# Patient Record
Sex: FEMALE | Race: WHITE | NOT HISPANIC OR LATINO | Employment: OTHER | URBAN - METROPOLITAN AREA
[De-identification: names, ages, dates, MRNs, and addresses within clinical notes are randomized per-mention and may not be internally consistent; named-entity substitution may affect disease eponyms.]

---

## 2024-02-27 ENCOUNTER — HOSPITAL ENCOUNTER (EMERGENCY)
Facility: HOSPITAL | Age: 49
Discharge: HOME | End: 2024-02-27
Attending: EMERGENCY MEDICINE | Admitting: EMERGENCY MEDICINE
Payer: MEDICARE

## 2024-02-27 ENCOUNTER — APPOINTMENT (OUTPATIENT)
Dept: CARDIOLOGY | Facility: HOSPITAL | Age: 49
End: 2024-02-27
Payer: MEDICARE

## 2024-02-27 VITALS
OXYGEN SATURATION: 98 % | DIASTOLIC BLOOD PRESSURE: 90 MMHG | RESPIRATION RATE: 16 BRPM | SYSTOLIC BLOOD PRESSURE: 122 MMHG | HEART RATE: 95 BPM | TEMPERATURE: 97.6 F

## 2024-02-27 DIAGNOSIS — E87.6 HYPOKALEMIA: ICD-10-CM

## 2024-02-27 DIAGNOSIS — M79.605 BILATERAL LEG PAIN: Primary | ICD-10-CM

## 2024-02-27 DIAGNOSIS — M79.604 BILATERAL LEG PAIN: Primary | ICD-10-CM

## 2024-02-27 LAB
ALBUMIN SERPL-MCNC: 4.4 G/DL (ref 3.5–5.2)
ALBUMIN/GLOB SERPL: 1.7 G/DL
ALP SERPL-CCNC: 72 U/L (ref 39–117)
ALT SERPL W P-5'-P-CCNC: 15 U/L (ref 1–33)
ANION GAP SERPL CALCULATED.3IONS-SCNC: 10 MMOL/L (ref 5–15)
AST SERPL-CCNC: 18 U/L (ref 1–32)
BASOPHILS # BLD AUTO: 0.09 10*3/MM3 (ref 0–0.2)
BASOPHILS NFR BLD AUTO: 1.2 % (ref 0–1.5)
BH CV LOWER VASCULAR LEFT COMMON FEMORAL AUGMENT: NORMAL
BH CV LOWER VASCULAR LEFT COMMON FEMORAL COMPETENT: NORMAL
BH CV LOWER VASCULAR LEFT COMMON FEMORAL COMPRESS: NORMAL
BH CV LOWER VASCULAR LEFT COMMON FEMORAL PHASIC: NORMAL
BH CV LOWER VASCULAR LEFT COMMON FEMORAL SPONT: NORMAL
BH CV LOWER VASCULAR LEFT DISTAL FEMORAL COMPRESS: NORMAL
BH CV LOWER VASCULAR LEFT GASTRONEMIUS COMPRESS: NORMAL
BH CV LOWER VASCULAR LEFT GREATER SAPH AK COMPRESS: NORMAL
BH CV LOWER VASCULAR LEFT GREATER SAPH BK COMPRESS: NORMAL
BH CV LOWER VASCULAR LEFT LESSER SAPH COMPRESS: NORMAL
BH CV LOWER VASCULAR LEFT MID FEMORAL AUGMENT: NORMAL
BH CV LOWER VASCULAR LEFT MID FEMORAL COMPETENT: NORMAL
BH CV LOWER VASCULAR LEFT MID FEMORAL COMPRESS: NORMAL
BH CV LOWER VASCULAR LEFT MID FEMORAL PHASIC: NORMAL
BH CV LOWER VASCULAR LEFT MID FEMORAL SPONT: NORMAL
BH CV LOWER VASCULAR LEFT PERONEAL COMPRESS: NORMAL
BH CV LOWER VASCULAR LEFT POPLITEAL AUGMENT: NORMAL
BH CV LOWER VASCULAR LEFT POPLITEAL COMPETENT: NORMAL
BH CV LOWER VASCULAR LEFT POPLITEAL COMPRESS: NORMAL
BH CV LOWER VASCULAR LEFT POPLITEAL PHASIC: NORMAL
BH CV LOWER VASCULAR LEFT POPLITEAL SPONT: NORMAL
BH CV LOWER VASCULAR LEFT POSTERIOR TIBIAL COMPRESS: NORMAL
BH CV LOWER VASCULAR LEFT PROFUNDA FEMORAL COMPRESS: NORMAL
BH CV LOWER VASCULAR LEFT PROXIMAL FEMORAL COMPRESS: NORMAL
BH CV LOWER VASCULAR LEFT SAPHENOFEMORAL JUNCTION COMPRESS: NORMAL
BH CV LOWER VASCULAR RIGHT COMMON FEMORAL AUGMENT: NORMAL
BH CV LOWER VASCULAR RIGHT COMMON FEMORAL COMPETENT: NORMAL
BH CV LOWER VASCULAR RIGHT COMMON FEMORAL COMPRESS: NORMAL
BH CV LOWER VASCULAR RIGHT COMMON FEMORAL PHASIC: NORMAL
BH CV LOWER VASCULAR RIGHT COMMON FEMORAL SPONT: NORMAL
BH CV LOWER VASCULAR RIGHT DISTAL FEMORAL COMPRESS: NORMAL
BH CV LOWER VASCULAR RIGHT GASTRONEMIUS COMPRESS: NORMAL
BH CV LOWER VASCULAR RIGHT GREATER SAPH AK COMPRESS: NORMAL
BH CV LOWER VASCULAR RIGHT GREATER SAPH BK COMPRESS: NORMAL
BH CV LOWER VASCULAR RIGHT LESSER SAPH COMPRESS: NORMAL
BH CV LOWER VASCULAR RIGHT MID FEMORAL AUGMENT: NORMAL
BH CV LOWER VASCULAR RIGHT MID FEMORAL COMPETENT: NORMAL
BH CV LOWER VASCULAR RIGHT MID FEMORAL COMPRESS: NORMAL
BH CV LOWER VASCULAR RIGHT MID FEMORAL PHASIC: NORMAL
BH CV LOWER VASCULAR RIGHT MID FEMORAL SPONT: NORMAL
BH CV LOWER VASCULAR RIGHT PERONEAL COMPRESS: NORMAL
BH CV LOWER VASCULAR RIGHT POPLITEAL AUGMENT: NORMAL
BH CV LOWER VASCULAR RIGHT POPLITEAL COMPETENT: NORMAL
BH CV LOWER VASCULAR RIGHT POPLITEAL COMPRESS: NORMAL
BH CV LOWER VASCULAR RIGHT POPLITEAL PHASIC: NORMAL
BH CV LOWER VASCULAR RIGHT POPLITEAL SPONT: NORMAL
BH CV LOWER VASCULAR RIGHT POSTERIOR TIBIAL COMPRESS: NORMAL
BH CV LOWER VASCULAR RIGHT PROFUNDA FEMORAL COMPRESS: NORMAL
BH CV LOWER VASCULAR RIGHT PROXIMAL FEMORAL COMPRESS: NORMAL
BH CV LOWER VASCULAR RIGHT SAPHENOFEMORAL JUNCTION COMPRESS: NORMAL
BH CV VAS PRELIMINARY FINDINGS SCRIPTING: 1
BILIRUB SERPL-MCNC: 0.3 MG/DL (ref 0–1.2)
BUN SERPL-MCNC: 20 MG/DL (ref 6–20)
BUN/CREAT SERPL: 18.9 (ref 7–25)
CALCIUM SPEC-SCNC: 10.2 MG/DL (ref 8.6–10.5)
CHLORIDE SERPL-SCNC: 102 MMOL/L (ref 98–107)
CO2 SERPL-SCNC: 25 MMOL/L (ref 22–29)
CREAT SERPL-MCNC: 1.06 MG/DL (ref 0.57–1)
DEPRECATED RDW RBC AUTO: 41.3 FL (ref 37–54)
EGFRCR SERPLBLD CKD-EPI 2021: 64.1 ML/MIN/1.73
EOSINOPHIL # BLD AUTO: 0.44 10*3/MM3 (ref 0–0.4)
EOSINOPHIL NFR BLD AUTO: 5.7 % (ref 0.3–6.2)
ERYTHROCYTE [DISTWIDTH] IN BLOOD BY AUTOMATED COUNT: 12.7 % (ref 12.3–15.4)
GLOBULIN UR ELPH-MCNC: 2.6 GM/DL
GLUCOSE SERPL-MCNC: 115 MG/DL (ref 65–99)
HCT VFR BLD AUTO: 38.4 % (ref 34–46.6)
HGB BLD-MCNC: 13 G/DL (ref 12–15.9)
HOLD SPECIMEN: NORMAL
HOLD SPECIMEN: NORMAL
IMM GRANULOCYTES # BLD AUTO: 0.03 10*3/MM3 (ref 0–0.05)
IMM GRANULOCYTES NFR BLD AUTO: 0.4 % (ref 0–0.5)
LYMPHOCYTES # BLD AUTO: 2.6 10*3/MM3 (ref 0.7–3.1)
LYMPHOCYTES NFR BLD AUTO: 33.4 % (ref 19.6–45.3)
MAGNESIUM SERPL-MCNC: 1.8 MG/DL (ref 1.6–2.6)
MCH RBC QN AUTO: 31.1 PG (ref 26.6–33)
MCHC RBC AUTO-ENTMCNC: 33.9 G/DL (ref 31.5–35.7)
MCV RBC AUTO: 91.9 FL (ref 79–97)
MONOCYTES # BLD AUTO: 0.49 10*3/MM3 (ref 0.1–0.9)
MONOCYTES NFR BLD AUTO: 6.3 % (ref 5–12)
NEUTROPHILS NFR BLD AUTO: 4.13 10*3/MM3 (ref 1.7–7)
NEUTROPHILS NFR BLD AUTO: 53 % (ref 42.7–76)
NRBC BLD AUTO-RTO: 0 /100 WBC (ref 0–0.2)
PLATELET # BLD AUTO: 260 10*3/MM3 (ref 140–450)
PMV BLD AUTO: 11.4 FL (ref 6–12)
POTASSIUM SERPL-SCNC: 3.4 MMOL/L (ref 3.5–5.2)
PROT SERPL-MCNC: 7 G/DL (ref 6–8.5)
RBC # BLD AUTO: 4.18 10*6/MM3 (ref 3.77–5.28)
SODIUM SERPL-SCNC: 137 MMOL/L (ref 136–145)
WBC NRBC COR # BLD AUTO: 7.78 10*3/MM3 (ref 3.4–10.8)
WHOLE BLOOD HOLD COAG: NORMAL
WHOLE BLOOD HOLD SPECIMEN: NORMAL

## 2024-02-27 PROCEDURE — 93970 EXTREMITY STUDY: CPT

## 2024-02-27 PROCEDURE — 99284 EMERGENCY DEPT VISIT MOD MDM: CPT

## 2024-02-27 PROCEDURE — 36415 COLL VENOUS BLD VENIPUNCTURE: CPT | Performed by: STUDENT IN AN ORGANIZED HEALTH CARE EDUCATION/TRAINING PROGRAM

## 2024-02-27 PROCEDURE — 80053 COMPREHEN METABOLIC PANEL: CPT | Performed by: STUDENT IN AN ORGANIZED HEALTH CARE EDUCATION/TRAINING PROGRAM

## 2024-02-27 PROCEDURE — 85025 COMPLETE CBC W/AUTO DIFF WBC: CPT | Performed by: STUDENT IN AN ORGANIZED HEALTH CARE EDUCATION/TRAINING PROGRAM

## 2024-02-27 PROCEDURE — 83735 ASSAY OF MAGNESIUM: CPT | Performed by: STUDENT IN AN ORGANIZED HEALTH CARE EDUCATION/TRAINING PROGRAM

## 2024-02-27 RX ORDER — POTASSIUM CHLORIDE 750 MG/1
40 TABLET, FILM COATED, EXTENDED RELEASE ORAL ONCE
Status: COMPLETED | OUTPATIENT
Start: 2024-02-27 | End: 2024-02-27

## 2024-02-27 RX ADMIN — POTASSIUM CHLORIDE 40 MEQ: 750 TABLET, EXTENDED RELEASE ORAL at 21:21

## 2024-02-28 NOTE — ED PROVIDER NOTES
EMERGENCY DEPARTMENT ENCOUNTER  Room Number:  T01/01  PCP: Verito Fulton MD  Independent Historians: Patient      HPI:  Chief Complaint: had concerns including Leg Swelling.     A complete HPI/ROS/PMH/PSH/SH/FH are unobtainable due to: None    Chronic or social conditions impacting patient care (Social Determinants of Health): None      Context: Selma SMART is a 48 y.o. female with a medical history of hypothyroidism, GERD, history of DVT, anxiety, hypertension, hyperlipidemia, and IBS who presents emergency department with bilateral lower extremity edema and aching in her legs x 3 weeks.  Patient says this started after switching her metoprolol formulation from tartrate to succinate.  She had to make this change because the new supply at the pharmacy had red dye.  She says that she has aching in the fronts of her legs down her shins.  She denies standing frequently and denies increased activity.  She says she has been wearing compression socks and elevating her legs with minimal relief of symptoms.  She says at times her feet look red but she does have Raynaud's.  She denies numbness or tingling.  She does have a history of DVT in her upper extremity secondary to a PICC line but is not currently anticoagulated.  She denies recent travel, hospitalizations, or surgeries.  Patient says she does have a history of hypokalemia and hypomagnesemia.  She takes these supplements as needed.        Review of prior external notes (non-ED) -and- Review of prior external test results outside of this encounter:   Patient had labs drawn on 2/22/2024, I reviewed these labs, potassium was 3.5, sodium 138, renal function was normal.    Patient had a duplex ultrasound on 12/15/2023 of her right lower extremity which was negative for DVT    Patient had a duplex ultrasound of her left upper extremity on 11/27/2023 which was negative for DVT    PAST MEDICAL HISTORY  Active Ambulatory Problems     Diagnosis Date Noted    Acquired  hypothyroidism 2017    Gastroesophageal reflux disease without esophagitis 2017    Encounter for long-term (current) use of NSAIDs 2017    Depression 2017    History of DVT (deep vein thrombosis) 2017    Arthritis 2017    ZAINA (generalized anxiety disorder) 2017    Essential hypertension 2017    Slow transit constipation 2017    Menstrual irregularity 2017    Medicare annual wellness visit, initial 2017    Dizziness 2017    Bilateral hearing loss 2017    Vitamin D deficiency 2017    Hashimoto's thyroiditis 2017    Anemia 2017    Pain and swelling of right lower leg 2017    Thyroid nodule 2017    Hypercoagulable state 2017    Impacted cerumen of both ears 2017    Chest pain, atypical 2020     Resolved Ambulatory Problems     Diagnosis Date Noted    No Resolved Ambulatory Problems     Past Medical History:   Diagnosis Date    Anxiety     Colon polyp     GERD (gastroesophageal reflux disease)     HL (hearing loss)     Hyperlipidemia     Hypertension     Hyperthyroidism     Hypothyroidism     Inflammatory bowel disease     Peptic ulceration          PAST SURGICAL HISTORY  Past Surgical History:   Procedure Laterality Date    COSMETIC SURGERY      ENDOMETRIAL ABLATION      HERNIA REPAIR      WISDOM TOOTH EXTRACTION           FAMILY HISTORY  Family History   Problem Relation Age of Onset    Diabetes Mother     Depression Mother     Lung cancer Mother     Hyperlipidemia Father     Heart disease Father          SOCIAL HISTORY  Social History     Socioeconomic History    Marital status: Single   Tobacco Use    Smoking status: Former     Packs/day: 0.50     Years: 15.00     Additional pack years: 0.00     Total pack years: 7.50     Types: Cigarettes     Quit date:      Years since quittin.1    Smokeless tobacco: Never   Substance and Sexual Activity    Alcohol use: No    Drug use: No     Sexual activity: Not Currently         ALLERGIES  Gabapentin, Hydrocodone, Pineapple, Pregabalin, Amitriptyline, Codeine, Metoprolol, Amlodipine, and Prednisone      REVIEW OF SYSTEMS  Included in HPI  All systems reviewed and negative except for those discussed in HPI.      PHYSICAL EXAM    I have reviewed the triage vital signs and nursing notes.    ED Triage Vitals   Temp Heart Rate Resp BP SpO2   02/27/24 1835 02/27/24 1835 02/27/24 1835 02/27/24 1847 02/27/24 1835   97.6 °F (36.4 °C) 86 16 150/81 98 %      Temp src Heart Rate Source Patient Position BP Location FiO2 (%)   -- -- -- -- --              Physical Exam  Constitutional:       General: She is not in acute distress.     Appearance: Normal appearance. She is obese.   HENT:      Head: Normocephalic and atraumatic.      Nose: Nose normal.      Mouth/Throat:      Mouth: Mucous membranes are moist.   Eyes:      Extraocular Movements: Extraocular movements intact.      Pupils: Pupils are equal, round, and reactive to light.   Cardiovascular:      Rate and Rhythm: Normal rate and regular rhythm.      Pulses: Normal pulses.      Heart sounds: Normal heart sounds.   Pulmonary:      Effort: Pulmonary effort is normal. No respiratory distress.      Breath sounds: Normal breath sounds.   Abdominal:      General: Abdomen is flat. There is no distension.      Palpations: Abdomen is soft.      Tenderness: There is no abdominal tenderness.   Musculoskeletal:         General: Normal range of motion.      Cervical back: Normal range of motion and neck supple.      Comments: No obvious swelling or discoloration to the bilateral lower extremities.  No tenderness to palpation.  Sensation is intact to light touch throughout the bilateral lower extremities. Muscle strength is 5/5 and symmetrical with plantarflexion and EHL. Patellar reflexes are 2+ and equal bilaterally. DP and PT pulses are 2+ bilaterally.      Skin:     General: Skin is warm and dry.      Capillary Refill:  Capillary refill takes less than 2 seconds.   Neurological:      General: No focal deficit present.      Mental Status: She is alert and oriented to person, place, and time.   Psychiatric:         Mood and Affect: Mood normal.         Behavior: Behavior normal.             LAB RESULTS  Recent Results (from the past 24 hour(s))   Comprehensive Metabolic Panel    Collection Time: 02/27/24  6:42 PM    Specimen: Blood   Result Value Ref Range    Glucose 115 (H) 65 - 99 mg/dL    BUN 20 6 - 20 mg/dL    Creatinine 1.06 (H) 0.57 - 1.00 mg/dL    Sodium 137 136 - 145 mmol/L    Potassium 3.4 (L) 3.5 - 5.2 mmol/L    Chloride 102 98 - 107 mmol/L    CO2 25.0 22.0 - 29.0 mmol/L    Calcium 10.2 8.6 - 10.5 mg/dL    Total Protein 7.0 6.0 - 8.5 g/dL    Albumin 4.4 3.5 - 5.2 g/dL    ALT (SGPT) 15 1 - 33 U/L    AST (SGOT) 18 1 - 32 U/L    Alkaline Phosphatase 72 39 - 117 U/L    Total Bilirubin 0.3 0.0 - 1.2 mg/dL    Globulin 2.6 gm/dL    A/G Ratio 1.7 g/dL    BUN/Creatinine Ratio 18.9 7.0 - 25.0    Anion Gap 10.0 5.0 - 15.0 mmol/L    eGFR 64.1 >60.0 mL/min/1.73   Green Top (Gel)    Collection Time: 02/27/24  6:42 PM   Result Value Ref Range    Extra Tube Hold for add-ons.    Lavender Top    Collection Time: 02/27/24  6:42 PM   Result Value Ref Range    Extra Tube hold for add-on    Gold Top - SST    Collection Time: 02/27/24  6:42 PM   Result Value Ref Range    Extra Tube Hold for add-ons.    Light Blue Top    Collection Time: 02/27/24  6:42 PM   Result Value Ref Range    Extra Tube Hold for add-ons.    CBC Auto Differential    Collection Time: 02/27/24  6:42 PM    Specimen: Blood   Result Value Ref Range    WBC 7.78 3.40 - 10.80 10*3/mm3    RBC 4.18 3.77 - 5.28 10*6/mm3    Hemoglobin 13.0 12.0 - 15.9 g/dL    Hematocrit 38.4 34.0 - 46.6 %    MCV 91.9 79.0 - 97.0 fL    MCH 31.1 26.6 - 33.0 pg    MCHC 33.9 31.5 - 35.7 g/dL    RDW 12.7 12.3 - 15.4 %    RDW-SD 41.3 37.0 - 54.0 fl    MPV 11.4 6.0 - 12.0 fL    Platelets 260 140 - 450 10*3/mm3     Neutrophil % 53.0 42.7 - 76.0 %    Lymphocyte % 33.4 19.6 - 45.3 %    Monocyte % 6.3 5.0 - 12.0 %    Eosinophil % 5.7 0.3 - 6.2 %    Basophil % 1.2 0.0 - 1.5 %    Immature Grans % 0.4 0.0 - 0.5 %    Neutrophils, Absolute 4.13 1.70 - 7.00 10*3/mm3    Lymphocytes, Absolute 2.60 0.70 - 3.10 10*3/mm3    Monocytes, Absolute 0.49 0.10 - 0.90 10*3/mm3    Eosinophils, Absolute 0.44 (H) 0.00 - 0.40 10*3/mm3    Basophils, Absolute 0.09 0.00 - 0.20 10*3/mm3    Immature Grans, Absolute 0.03 0.00 - 0.05 10*3/mm3    nRBC 0.0 0.0 - 0.2 /100 WBC   Magnesium    Collection Time: 02/27/24  6:42 PM    Specimen: Blood   Result Value Ref Range    Magnesium 1.8 1.6 - 2.6 mg/dL   Duplex Venous Lower Extremity - Bilateral CAR    Collection Time: 02/27/24  7:46 PM   Result Value Ref Range    Right Common Femoral Spont Y     Right Common Femoral Competent Y     Right Common Femoral Phasic Y     Right Common Femoral Compress C     Right Common Femoral Augment Y     Right Saphenofemoral Junction Compress C     Right Profunda Femoral Compress C     Right Proximal Femoral Compress C     Right Mid Femoral Spont Y     Right Mid Femoral Competent Y     Right Mid Femoral Phasic Y     Right Mid Femoral Compress C     Right Mid Femoral Augment Y     Right Distal Femoral Compress C     Right Popliteal Spont Y     Right Popliteal Competent Y     Right Popliteal Phasic Y     Right Popliteal Compress C     Right Popliteal Augment Y     Right Posterior Tibial Compress C     Right Peroneal Compress C     Right Gastronemius Compress C     Right Greater Saph AK Compress C     Right Greater Saph BK Compress C     Right Lesser Saph Compress C     Left Common Femoral Spont Y     Left Common Femoral Competent Y     Left Common Femoral Phasic Y     Left Common Femoral Compress C     Left Common Femoral Augment Y     Left Saphenofemoral Junction Compress C     Left Profunda Femoral Compress C     Left Proximal Femoral Compress C     Left Mid Femoral Spont Y      Left Mid Femoral Competent Y     Left Mid Femoral Phasic Y     Left Mid Femoral Compress C     Left Mid Femoral Augment Y     Left Distal Femoral Compress C     Left Popliteal Spont Y     Left Popliteal Competent Y     Left Popliteal Phasic Y     Left Popliteal Compress C     Left Popliteal Augment Y     Left Posterior Tibial Compress C     Left Peroneal Compress C     Left Gastronemius Compress C     Left Greater Saph AK Compress C     Left Greater Saph BK Compress C     Left Lesser Saph Compress C     BH CV VAS PRELIMINARY FINDINGS SCRIPTING 1.0          RADIOLOGY  Duplex Venous Lower Extremity - Bilateral CAR    Result Date: 2/27/2024    Normal bilateral lower extremity venous duplex scan.        MEDICATIONS GIVEN IN ER  Medications   potassium chloride (K-DUR,KLOR-CON) ER tablet 40 mEq (40 mEq Oral Given 2/27/24 2121)           OUTPATIENT MEDICATION MANAGEMENT:  No current Epic-ordered facility-administered medications on file.     Current Outpatient Medications Ordered in Epic   Medication Sig Dispense Refill    aspirin (aspirin) 81 MG EC tablet Take 1 tablet by mouth Daily. 30 tablet 11    famotidine (PEPCID) 20 MG tablet Take 20 mg by mouth 2 (Two) Times a Day.      levothyroxine (SYNTHROID, LEVOTHROID) 88 MCG tablet 88 mcg Daily.      metoprolol tartrate (LOPRESSOR) 50 MG tablet Take 1 tablet by mouth 2 (Two) Times a Day. (Patient taking differently: Take 50 mg by mouth 2 (Two) Times a Day. 50mg in the morning and 25mg in the evening.) 90 tablet 3    montelukast (SINGULAIR) 10 MG tablet Take 10 mg by mouth Every Night.      nabumetone (RELAFEN) 750 MG tablet Daily.      pantoprazole (PROTONIX) 40 MG EC tablet Take 40 mg by mouth Daily.      potassium chloride ER (K-TAB) 20 MEQ tablet controlled-release ER tablet Take 20 mEq by mouth 2 (Two) Times a Day.      rosuvastatin (CRESTOR) 10 MG tablet TAKE ONE TABLET BY MOUTH DAILY 90 tablet 1    topiramate (TOPAMAX) 25 MG tablet Take 25 mg by mouth Daily.              PROGRESS, DATA ANALYSIS, CONSULTS, AND MEDICAL DECISION MAKING  ORDERS PLACED DURING THIS VISIT:  Orders Placed This Encounter   Procedures    [place] Draw    Comprehensive Metabolic Panel    CBC Auto Differential    Magnesium    NPO Diet NPO Type: Strict NPO    Undress & Gown    CBC & Differential    Green Top (Gel)    Lavender Top    Gold Top - SST    Light Blue Top       All labs have been independently interpreted by me.  All radiology studies have been reviewed by me. All EKG's have been independently viewed and interpreted by me.  Discussion below represents my analysis of pertinent findings related to patient's condition, differential diagnosis, treatment plan and final disposition.    Differential diagnosis includes but is not limited to:   My diagnosis for lower extremity pain and injury includes but is not limited to hip fracture, femur fracture, hip dislocation, hip contusion, hip sprain, hip strain, pelvic fracture, ischio-tibial band pain, ischio-tibial band bursitis, knee sprain, patella dislocation, knee dislocation, internal derangement of knee, fractures of the femur, tibia, fibula, ankle, foot and digits, ankle sprain, ankle dislocation, Lisfranc fracture, fracture dislocations of the digits, pulmonary embolism, claudication, peripheral vascular disease, gout, osteoarthritis, rheumatoid arthritis, bursitis, septic joint, poly-rheumatica, polyarthralgia and other inflammatory or infectious disease processes       ED Course:  ED Course as of 02/27/24 2312 Tue Feb 27, 2024 2007 Preliminary report per vascular tech is negative for DVT in BLE [MP]   2026 Potassium(!): 3.4 [CC]   2045 Magnesium: 1.8 [CC]   2220 I rechecked the patient.  I discussed the patient's labs, radiology findings (including all incidental findings), diagnosis, and plan for discharge.  A repeat exam reveals no new worrisome changes from my initial exam findings.  The patient understands that the fact that they are  being discharged does not denote that nothing is abnormal, it indicates that no clinical emergency is present and that they must follow-up as directed in order to properly maintain their health.  Follow-up instructions (specifically listed below) and return to ER precautions were given at this time.  I specifically instructed the patient to follow-up with their PCP.  The patient understands and agrees with the plan, and is ready for discharge.  All questions answered.   [CC]      ED Course User Index  [CC] Diana Amaya PA-C  [MP] Guera Block PA-C           AS OF 23:12 EST VITALS:    BP - 122/90  HR - 95  TEMP - 97.6 °F (36.4 °C)  O2 SATS - 98%        MDM:  Patient is a 48-year-old female with a history of DVT presents emergency department today with bilateral lower extremity edema x 3 weeks.  On arrival here in the emergency department vitals are reassuring, she is afebrile.  My exam the patient has no appreciable swelling, discoloration, or warmth in the lower extremities.  She has no significant tenderness to palpation.  Patient was evaluated with duplex ultrasound of the bilateral lower extremities which is negative for DVT.  She was evaluated with labs as the patient has a history of electrolyte abnormalities.  Her potassium was mildly low.  She may be having some muscle spasms which may be contributing to her soreness.  Potassium was replaced orally here in the ED.  She correlates the swelling in her legs to changing formulations of metoprolol.  I encouraged her to discuss this with her PCP.  She has no obvious signs of infection and her limbs are warm well-perfused.  Encouraged her to elevate, wear compression stockings, take Tylenol and ibuprofen as needed for pain.  Patient is appropriate for discharge with outpatient follow-up.        DIAGNOSIS  Final diagnoses:   Bilateral leg pain   Hypokalemia         DISPOSITION  ED Disposition       ED Disposition   Discharge    Condition   Stable     Comment   --                      Please note that portions of this document were completed with a voice recognition program.    Note Disclaimer: At [facility], we believe that sharing information builds trust and better relationships. You are receiving this note because you recently visited [facility]. It is possible you will see health information before a provider has talked with you about it. This kind of information can be easy to misunderstand. To help you fully understand what it means for your health, we urge you to discuss this note with your provider.     Diana Amaya PA-C  02/27/24 3149

## 2024-02-28 NOTE — ED PROVIDER NOTES
MD ATTESTATION NOTE    SHARED VISIT: This visit was performed by BOTH a physician and an APC. The substantive portion of the medical decision making was performed by this attesting physician who made or approved the management plan and takes responsibility for patient management. All studies documented in the APC note (if performed) were independently interpreted by me.    The FRANCO and I have discussed this patient's history, physical exam, MDM, and treatment plan.  I have reviewed the documentation and personally had a face to face interaction with the patient. The attached note describes my personal findings.      Selma SMART is a 48 y.o. female who presents to the ED c/o acute leg pain that feels like cramping to her calves bilaterally.  This has been ongoing for about 3 weeks.  She feels that she has some associated edema to her legs.  No fever or chest pain.  She is not currently short of breath.    On exam:  GENERAL: not distressed  HENT: nares patent  EYES: no scleral icterus  CV: regular rhythm, regular rate, 2+ DP pulses bilaterally  RESPIRATORY: normal effort  ABDOMEN: soft  MUSCULOSKELETAL: no deformity, no lower extremity edema or tenderness on my exam, no overlying redness or warmth to the legs  NEURO: alert, moves all extremities, follows commands  SKIN: warm, dry    Labs  Recent Results (from the past 24 hour(s))   Comprehensive Metabolic Panel    Collection Time: 02/27/24  6:42 PM    Specimen: Blood   Result Value Ref Range    Glucose 115 (H) 65 - 99 mg/dL    BUN 20 6 - 20 mg/dL    Creatinine 1.06 (H) 0.57 - 1.00 mg/dL    Sodium 137 136 - 145 mmol/L    Potassium 3.4 (L) 3.5 - 5.2 mmol/L    Chloride 102 98 - 107 mmol/L    CO2 25.0 22.0 - 29.0 mmol/L    Calcium 10.2 8.6 - 10.5 mg/dL    Total Protein 7.0 6.0 - 8.5 g/dL    Albumin 4.4 3.5 - 5.2 g/dL    ALT (SGPT) 15 1 - 33 U/L    AST (SGOT) 18 1 - 32 U/L    Alkaline Phosphatase 72 39 - 117 U/L    Total Bilirubin 0.3 0.0 - 1.2 mg/dL    Globulin 2.6 gm/dL     A/G Ratio 1.7 g/dL    BUN/Creatinine Ratio 18.9 7.0 - 25.0    Anion Gap 10.0 5.0 - 15.0 mmol/L    eGFR 64.1 >60.0 mL/min/1.73   Green Top (Gel)    Collection Time: 02/27/24  6:42 PM   Result Value Ref Range    Extra Tube Hold for add-ons.    Lavender Top    Collection Time: 02/27/24  6:42 PM   Result Value Ref Range    Extra Tube hold for add-on    Gold Top - SST    Collection Time: 02/27/24  6:42 PM   Result Value Ref Range    Extra Tube Hold for add-ons.    Light Blue Top    Collection Time: 02/27/24  6:42 PM   Result Value Ref Range    Extra Tube Hold for add-ons.    CBC Auto Differential    Collection Time: 02/27/24  6:42 PM    Specimen: Blood   Result Value Ref Range    WBC 7.78 3.40 - 10.80 10*3/mm3    RBC 4.18 3.77 - 5.28 10*6/mm3    Hemoglobin 13.0 12.0 - 15.9 g/dL    Hematocrit 38.4 34.0 - 46.6 %    MCV 91.9 79.0 - 97.0 fL    MCH 31.1 26.6 - 33.0 pg    MCHC 33.9 31.5 - 35.7 g/dL    RDW 12.7 12.3 - 15.4 %    RDW-SD 41.3 37.0 - 54.0 fl    MPV 11.4 6.0 - 12.0 fL    Platelets 260 140 - 450 10*3/mm3    Neutrophil % 53.0 42.7 - 76.0 %    Lymphocyte % 33.4 19.6 - 45.3 %    Monocyte % 6.3 5.0 - 12.0 %    Eosinophil % 5.7 0.3 - 6.2 %    Basophil % 1.2 0.0 - 1.5 %    Immature Grans % 0.4 0.0 - 0.5 %    Neutrophils, Absolute 4.13 1.70 - 7.00 10*3/mm3    Lymphocytes, Absolute 2.60 0.70 - 3.10 10*3/mm3    Monocytes, Absolute 0.49 0.10 - 0.90 10*3/mm3    Eosinophils, Absolute 0.44 (H) 0.00 - 0.40 10*3/mm3    Basophils, Absolute 0.09 0.00 - 0.20 10*3/mm3    Immature Grans, Absolute 0.03 0.00 - 0.05 10*3/mm3    nRBC 0.0 0.0 - 0.2 /100 WBC   Magnesium    Collection Time: 02/27/24  6:42 PM    Specimen: Blood   Result Value Ref Range    Magnesium 1.8 1.6 - 2.6 mg/dL   Duplex Venous Lower Extremity - Bilateral CAR    Collection Time: 02/27/24  7:46 PM   Result Value Ref Range    Right Common Femoral Spont Y     Right Common Femoral Competent Y     Right Common Femoral Phasic Y     Right Common Femoral Compress C     Right  Common Femoral Augment Y     Right Saphenofemoral Junction Compress C     Right Profunda Femoral Compress C     Right Proximal Femoral Compress C     Right Mid Femoral Spont Y     Right Mid Femoral Competent Y     Right Mid Femoral Phasic Y     Right Mid Femoral Compress C     Right Mid Femoral Augment Y     Right Distal Femoral Compress C     Right Popliteal Spont Y     Right Popliteal Competent Y     Right Popliteal Phasic Y     Right Popliteal Compress C     Right Popliteal Augment Y     Right Posterior Tibial Compress C     Right Peroneal Compress C     Right Gastronemius Compress C     Right Greater Saph AK Compress C     Right Greater Saph BK Compress C     Right Lesser Saph Compress C     Left Common Femoral Spont Y     Left Common Femoral Competent Y     Left Common Femoral Phasic Y     Left Common Femoral Compress C     Left Common Femoral Augment Y     Left Saphenofemoral Junction Compress C     Left Profunda Femoral Compress C     Left Proximal Femoral Compress C     Left Mid Femoral Spont Y     Left Mid Femoral Competent Y     Left Mid Femoral Phasic Y     Left Mid Femoral Compress C     Left Mid Femoral Augment Y     Left Distal Femoral Compress C     Left Popliteal Spont Y     Left Popliteal Competent Y     Left Popliteal Phasic Y     Left Popliteal Compress C     Left Popliteal Augment Y     Left Posterior Tibial Compress C     Left Peroneal Compress C     Left Gastronemius Compress C     Left Greater Saph AK Compress C     Left Greater Saph BK Compress C     Left Lesser Saph Compress C     BH CV VAS PRELIMINARY FINDINGS SCRIPTING 1.0        Radiology  Duplex Venous Lower Extremity - Bilateral CAR    Result Date: 2/27/2024    Normal bilateral lower extremity venous duplex scan.      Medications given in the ED:  Medications   potassium chloride (K-DUR,KLOR-CON) ER tablet 40 mEq (40 mEq Oral Given 2/27/24 2121)       Orders placed during this visit:  Orders Placed This Encounter   Procedures    [place]  Draw    Comprehensive Metabolic Panel    CBC Auto Differential    Magnesium    NPO Diet NPO Type: Strict NPO    Undress & Gown    CBC & Differential    Green Top (Gel)    Lavender Top    Gold Top - SST    Light Blue Top       Medical Decision Making:  ED Course as of 02/27/24 2223 Tue Feb 27, 2024 2007 Preliminary report per vascular tech is negative for DVT in BLE [MP]   2026 Potassium(!): 3.4 [CC]   2045 Magnesium: 1.8 [CC]      ED Course User Index  [CC] Diana Amaya PA-C  [MP] Guera Block PA-C       Differential diagnosis:  DVT, cellulitis, electrolyte abnormality, muscle strain    Diagnosis  Final diagnoses:   Bilateral leg pain   Hypokalemia          Taiwo Mar II, MD  02/27/24 2234

## 2024-04-01 PROBLEM — J30.2 SEASONAL ALLERGIES: Status: ACTIVE | Noted: 2024-04-01

## 2024-05-28 ENCOUNTER — HOSPITAL ENCOUNTER (EMERGENCY)
Facility: HOSPITAL | Age: 49
Discharge: HOME | End: 2024-05-28
Attending: STUDENT IN AN ORGANIZED HEALTH CARE EDUCATION/TRAINING PROGRAM | Admitting: STUDENT IN AN ORGANIZED HEALTH CARE EDUCATION/TRAINING PROGRAM
Payer: MEDICARE

## 2024-05-28 VITALS
WEIGHT: 143 LBS | TEMPERATURE: 97.4 F | DIASTOLIC BLOOD PRESSURE: 80 MMHG | HEART RATE: 90 BPM | SYSTOLIC BLOOD PRESSURE: 113 MMHG | BODY MASS INDEX: 25.34 KG/M2 | RESPIRATION RATE: 18 BRPM | OXYGEN SATURATION: 99 % | HEIGHT: 63 IN

## 2024-05-28 DIAGNOSIS — R53.1 WEAKNESS: Primary | ICD-10-CM

## 2024-05-28 LAB
ALBUMIN SERPL-MCNC: 4.5 G/DL (ref 3.5–5.2)
ALBUMIN/GLOB SERPL: 1.8 G/DL
ALP SERPL-CCNC: 70 U/L (ref 39–117)
ALT SERPL W P-5'-P-CCNC: 14 U/L (ref 1–33)
ANION GAP SERPL CALCULATED.3IONS-SCNC: 10.9 MMOL/L (ref 5–15)
AST SERPL-CCNC: 23 U/L (ref 1–32)
BASOPHILS # BLD AUTO: 0.11 10*3/MM3 (ref 0–0.2)
BASOPHILS NFR BLD AUTO: 1.3 % (ref 0–1.5)
BILIRUB SERPL-MCNC: 0.4 MG/DL (ref 0–1.2)
BILIRUB UR QL STRIP: NEGATIVE
BUN SERPL-MCNC: 13 MG/DL (ref 6–20)
BUN/CREAT SERPL: 10.9 (ref 7–25)
CALCIUM SPEC-SCNC: 10.4 MG/DL (ref 8.6–10.5)
CHLORIDE SERPL-SCNC: 104 MMOL/L (ref 98–107)
CLARITY UR: ABNORMAL
CO2 SERPL-SCNC: 23.1 MMOL/L (ref 22–29)
COLOR UR: YELLOW
CREAT SERPL-MCNC: 1.19 MG/DL (ref 0.57–1)
DEPRECATED RDW RBC AUTO: 41.7 FL (ref 37–54)
EGFRCR SERPLBLD CKD-EPI 2021: 55.8 ML/MIN/1.73
EOSINOPHIL # BLD AUTO: 0.54 10*3/MM3 (ref 0–0.4)
EOSINOPHIL NFR BLD AUTO: 6.4 % (ref 0.3–6.2)
ERYTHROCYTE [DISTWIDTH] IN BLOOD BY AUTOMATED COUNT: 12.1 % (ref 12.3–15.4)
GLOBULIN UR ELPH-MCNC: 2.5 GM/DL
GLUCOSE SERPL-MCNC: 96 MG/DL (ref 65–99)
GLUCOSE UR STRIP-MCNC: NEGATIVE MG/DL
HCG SERPL QL: NEGATIVE
HCT VFR BLD AUTO: 38.9 % (ref 34–46.6)
HGB BLD-MCNC: 13.1 G/DL (ref 12–15.9)
HGB UR QL STRIP.AUTO: NEGATIVE
HOLD SPECIMEN: NORMAL
IMM GRANULOCYTES # BLD AUTO: 0.02 10*3/MM3 (ref 0–0.05)
IMM GRANULOCYTES NFR BLD AUTO: 0.2 % (ref 0–0.5)
KETONES UR QL STRIP: NEGATIVE
LEUKOCYTE ESTERASE UR QL STRIP.AUTO: NEGATIVE
LIPASE SERPL-CCNC: 81 U/L (ref 13–60)
LYMPHOCYTES # BLD AUTO: 2.42 10*3/MM3 (ref 0.7–3.1)
LYMPHOCYTES NFR BLD AUTO: 28.8 % (ref 19.6–45.3)
MCH RBC QN AUTO: 31.5 PG (ref 26.6–33)
MCHC RBC AUTO-ENTMCNC: 33.7 G/DL (ref 31.5–35.7)
MCV RBC AUTO: 93.5 FL (ref 79–97)
MONOCYTES # BLD AUTO: 0.58 10*3/MM3 (ref 0.1–0.9)
MONOCYTES NFR BLD AUTO: 6.9 % (ref 5–12)
NEUTROPHILS NFR BLD AUTO: 4.74 10*3/MM3 (ref 1.7–7)
NEUTROPHILS NFR BLD AUTO: 56.4 % (ref 42.7–76)
NITRITE UR QL STRIP: NEGATIVE
NRBC BLD AUTO-RTO: 0 /100 WBC (ref 0–0.2)
PH UR STRIP.AUTO: 5.5 [PH] (ref 5–8)
PLATELET # BLD AUTO: 263 10*3/MM3 (ref 140–450)
PMV BLD AUTO: 11.7 FL (ref 6–12)
POTASSIUM SERPL-SCNC: 3.4 MMOL/L (ref 3.5–5.2)
PROT SERPL-MCNC: 7 G/DL (ref 6–8.5)
PROT UR QL STRIP: NEGATIVE
RBC # BLD AUTO: 4.16 10*6/MM3 (ref 3.77–5.28)
SODIUM SERPL-SCNC: 138 MMOL/L (ref 136–145)
SP GR UR STRIP: 1.01 (ref 1–1.03)
UROBILINOGEN UR QL STRIP: ABNORMAL
WBC NRBC COR # BLD AUTO: 8.41 10*3/MM3 (ref 3.4–10.8)
WHOLE BLOOD HOLD COAG: NORMAL
WHOLE BLOOD HOLD SPECIMEN: NORMAL

## 2024-05-28 PROCEDURE — 36415 COLL VENOUS BLD VENIPUNCTURE: CPT

## 2024-05-28 PROCEDURE — 80053 COMPREHEN METABOLIC PANEL: CPT

## 2024-05-28 PROCEDURE — 81003 URINALYSIS AUTO W/O SCOPE: CPT

## 2024-05-28 PROCEDURE — 25810000003 SODIUM CHLORIDE 0.9 % SOLUTION: Performed by: STUDENT IN AN ORGANIZED HEALTH CARE EDUCATION/TRAINING PROGRAM

## 2024-05-28 PROCEDURE — 83690 ASSAY OF LIPASE: CPT

## 2024-05-28 PROCEDURE — 99283 EMERGENCY DEPT VISIT LOW MDM: CPT

## 2024-05-28 PROCEDURE — 84703 CHORIONIC GONADOTROPIN ASSAY: CPT

## 2024-05-28 PROCEDURE — 85025 COMPLETE CBC W/AUTO DIFF WBC: CPT

## 2024-05-28 RX ORDER — SODIUM CHLORIDE 0.9 % (FLUSH) 0.9 %
10 SYRINGE (ML) INJECTION AS NEEDED
Status: DISCONTINUED | OUTPATIENT
Start: 2024-05-28 | End: 2024-05-28 | Stop reason: HOSPADM

## 2024-05-28 RX ADMIN — SODIUM CHLORIDE 1000 ML: 9 INJECTION, SOLUTION INTRAVENOUS at 15:36

## 2024-05-28 NOTE — ED PROVIDER NOTES
EMERGENCY DEPARTMENT ENCOUNTER  Room Number:  24/24  PCP: Verito Fulton MD  Independent Historians: Patient      HPI:  Chief Complaint: had concerns including Weakness - Generalized.     Context: Selma SMART is a 48 y.o. female with a medical history of Hashimoto's thyroiditis, history of DVT, GERD, hypertension, hyperlipidemia who presents to the ED c/o acute weakness, fatigue.  Patient recently had a procedure performed by ENT to help increase her hearing called a Baha implant.  Patient states since then she has felt dizzy and lightheaded and somewhat weak.  Patient states she was told by her surgeon that this is a typical process postoperatively.  Patient became concerned because she felt more weak today than she had been.  Patient additionally states she has had decreased oral intake.  Patient has follow-up in 1 week with otolaryngology      Review of prior external notes (non-ED) -and- Review of prior external test results outside of this encounter: Office visit with cardiology from 7/7/2020 reviewed and notable for history of hyperlipidemia, hypertension tension, Hashimoto's thyroiditis, chest pain.  Patient had reassuring stress test at echo with EF of 60 to 65%.  Patient was started on aspirin and counseled to follow-up as needed.    Prescription drug monitoring program review:         PAST MEDICAL HISTORY  Active Ambulatory Problems     Diagnosis Date Noted    Acquired hypothyroidism 04/17/2017    Gastroesophageal reflux disease without esophagitis 04/17/2017    Encounter for long-term (current) use of NSAIDs 04/17/2017    Depression 04/17/2017    History of DVT (deep vein thrombosis) 04/17/2017    Arthritis 04/17/2017    ZAINA (generalized anxiety disorder) 04/17/2017    Essential hypertension 04/17/2017    Slow transit constipation 04/17/2017    Menstrual irregularity 04/17/2017    Medicare annual wellness visit, initial 04/17/2017    Dizziness 04/17/2017    Bilateral hearing loss 04/17/2017    Vitamin  D deficiency 2017    Hashimoto's thyroiditis 2017    Anemia 2017    Pain and swelling of right lower leg 2017    Thyroid nodule 2017    Hypercoagulable state 2017    Impacted cerumen of both ears 2017    Chest pain, atypical 2020    Seasonal allergies 2024     Resolved Ambulatory Problems     Diagnosis Date Noted    No Resolved Ambulatory Problems     Past Medical History:   Diagnosis Date    Anxiety     Colon polyp     GERD (gastroesophageal reflux disease)     HL (hearing loss)     Hyperlipidemia     Hypertension     Hyperthyroidism     Hypothyroidism     Inflammatory bowel disease     Pain in left leg 2020    Pain in right leg 2020    Peptic ulceration     Radiculopathy, lumbar region 2020    Raynaud's syndrome without gangrene          PAST SURGICAL HISTORY  Past Surgical History:   Procedure Laterality Date    COSMETIC SURGERY      ENDOMETRIAL ABLATION      FOOT SURGERY      HERNIA REPAIR      OTHER SURGICAL HISTORY      surgery    VEIN SURGERY      WISDOM TOOTH EXTRACTION  1988         FAMILY HISTORY  Family History   Problem Relation Age of Onset    Diabetes Mother     Depression Mother     Lung cancer Mother     Hyperlipidemia Father     Heart disease Father     Other Other         blood clots         SOCIAL HISTORY  Social History     Socioeconomic History    Marital status: Single   Tobacco Use    Smoking status: Former     Current packs/day: 0.00     Average packs/day: 0.5 packs/day for 15.0 years (7.5 ttl pk-yrs)     Types: Cigarettes     Start date:      Quit date: 2016     Years since quittin.4    Smokeless tobacco: Never   Vaping Use    Vaping status: Unknown   Substance and Sexual Activity    Alcohol use: No    Drug use: No    Sexual activity: Not Currently         ALLERGIES  Gabapentin, Hydrocodone, Pineapple, Pregabalin, Amitriptyline, Codeine, Metoprolol, Amlodipine, and Prednisone      REVIEW OF SYSTEMS  Review of  Systems  Included in HPI  All systems reviewed and negative except for those discussed in HPI.      PHYSICAL EXAM    I have reviewed the triage vital signs and nursing notes.    ED Triage Vitals   Temp Heart Rate Resp BP SpO2   05/28/24 1417 05/28/24 1417 05/28/24 1417 05/28/24 1422 05/28/24 1417   97.4 °F (36.3 °C) 109 18 101/66 95 %      Temp src Heart Rate Source Patient Position BP Location FiO2 (%)   05/28/24 1417 05/28/24 1417 -- -- --   Tympanic Monitor          Physical Exam  GENERAL: alert, no acute distress  SKIN: Warm, dry  HENT: Normocephalic, atraumatic  EYES: no scleral icterus  CV: regular rhythm, regular rate  RESPIRATORY: normal effort, lungs clear  ABDOMEN: soft, nontender, nondistended  MUSCULOSKELETAL: no deformity  NEURO: alert, moves all extremities, follows commands            LAB RESULTS  Recent Results (from the past 24 hour(s))   Comprehensive Metabolic Panel    Collection Time: 05/28/24  2:24 PM    Specimen: Blood   Result Value Ref Range    Glucose 96 65 - 99 mg/dL    BUN 13 6 - 20 mg/dL    Creatinine 1.19 (H) 0.57 - 1.00 mg/dL    Sodium 138 136 - 145 mmol/L    Potassium 3.4 (L) 3.5 - 5.2 mmol/L    Chloride 104 98 - 107 mmol/L    CO2 23.1 22.0 - 29.0 mmol/L    Calcium 10.4 8.6 - 10.5 mg/dL    Total Protein 7.0 6.0 - 8.5 g/dL    Albumin 4.5 3.5 - 5.2 g/dL    ALT (SGPT) 14 1 - 33 U/L    AST (SGOT) 23 1 - 32 U/L    Alkaline Phosphatase 70 39 - 117 U/L    Total Bilirubin 0.4 0.0 - 1.2 mg/dL    Globulin 2.5 gm/dL    A/G Ratio 1.8 g/dL    BUN/Creatinine Ratio 10.9 7.0 - 25.0    Anion Gap 10.9 5.0 - 15.0 mmol/L    eGFR 55.8 (L) >60.0 mL/min/1.73   Lipase    Collection Time: 05/28/24  2:24 PM    Specimen: Blood   Result Value Ref Range    Lipase 81 (H) 13 - 60 U/L   hCG, Serum, Qualitative    Collection Time: 05/28/24  2:24 PM    Specimen: Blood   Result Value Ref Range    HCG Qualitative Negative Negative   Green Top (Gel)    Collection Time: 05/28/24  2:24 PM   Result Value Ref Range    Extra  Tube Hold for add-ons.    Lavender Top    Collection Time: 05/28/24  2:24 PM   Result Value Ref Range    Extra Tube hold for add-on    Light Blue Top    Collection Time: 05/28/24  2:24 PM   Result Value Ref Range    Extra Tube Hold for add-ons.    CBC Auto Differential    Collection Time: 05/28/24  2:24 PM    Specimen: Blood   Result Value Ref Range    WBC 8.41 3.40 - 10.80 10*3/mm3    RBC 4.16 3.77 - 5.28 10*6/mm3    Hemoglobin 13.1 12.0 - 15.9 g/dL    Hematocrit 38.9 34.0 - 46.6 %    MCV 93.5 79.0 - 97.0 fL    MCH 31.5 26.6 - 33.0 pg    MCHC 33.7 31.5 - 35.7 g/dL    RDW 12.1 (L) 12.3 - 15.4 %    RDW-SD 41.7 37.0 - 54.0 fl    MPV 11.7 6.0 - 12.0 fL    Platelets 263 140 - 450 10*3/mm3    Neutrophil % 56.4 42.7 - 76.0 %    Lymphocyte % 28.8 19.6 - 45.3 %    Monocyte % 6.9 5.0 - 12.0 %    Eosinophil % 6.4 (H) 0.3 - 6.2 %    Basophil % 1.3 0.0 - 1.5 %    Immature Grans % 0.2 0.0 - 0.5 %    Neutrophils, Absolute 4.74 1.70 - 7.00 10*3/mm3    Lymphocytes, Absolute 2.42 0.70 - 3.10 10*3/mm3    Monocytes, Absolute 0.58 0.10 - 0.90 10*3/mm3    Eosinophils, Absolute 0.54 (H) 0.00 - 0.40 10*3/mm3    Basophils, Absolute 0.11 0.00 - 0.20 10*3/mm3    Immature Grans, Absolute 0.02 0.00 - 0.05 10*3/mm3    nRBC 0.0 0.0 - 0.2 /100 WBC   Urinalysis With Microscopic If Indicated (No Culture) - Urine, Clean Catch    Collection Time: 05/28/24  5:08 PM    Specimen: Urine, Clean Catch   Result Value Ref Range    Color, UA Yellow Yellow, Straw    Appearance, UA Cloudy (A) Clear    pH, UA 5.5 5.0 - 8.0    Specific Gravity, UA 1.008 1.005 - 1.030    Glucose, UA Negative Negative    Ketones, UA Negative Negative    Bilirubin, UA Negative Negative    Blood, UA Negative Negative    Protein, UA Negative Negative    Leuk Esterase, UA Negative Negative    Nitrite, UA Negative Negative    Urobilinogen, UA 0.2 E.U./dL 0.2 - 1.0 E.U./dL         RADIOLOGY  No Radiology Exams Resulted Within Past 24 Hours      MEDICATIONS GIVEN IN ER  Medications    sodium chloride 0.9 % flush 10 mL (has no administration in time range)   sodium chloride 0.9 % bolus 1,000 mL (0 mL Intravenous Stopped 5/28/24 1750)         ORDERS PLACED DURING THIS VISIT:  Orders Placed This Encounter   Procedures    [place] Draw    Comprehensive Metabolic Panel    Lipase    Urinalysis With Microscopic If Indicated (No Culture) - Urine, Clean Catch    hCG, Serum, Qualitative    CBC Auto Differential    NPO Diet NPO Type: Strict NPO    Undress & Gown    Insert Peripheral IV    CBC & Differential    Green Top (Gel)    Lavender Top    Light Blue Top         OUTPATIENT MEDICATION MANAGEMENT:  Current Facility-Administered Medications Ordered in Epic   Medication Dose Route Frequency Provider Last Rate Last Admin    sodium chloride 0.9 % flush 10 mL  10 mL Intravenous PRN Emergency, Triage Protocol, MD         Current Outpatient Medications Ordered in Epic   Medication Sig Dispense Refill    aspirin (aspirin) 81 MG EC tablet Take 1 tablet by mouth Daily. 30 tablet 11    famotidine (PEPCID) 20 MG tablet Take 20 mg by mouth 2 (Two) Times a Day.      levothyroxine (SYNTHROID, LEVOTHROID) 88 MCG tablet 88 mcg Daily.      metoprolol tartrate (LOPRESSOR) 50 MG tablet Take 1 tablet by mouth 2 (Two) Times a Day. (Patient taking differently: Take 50 mg by mouth 2 (Two) Times a Day. 50mg in the morning and 25mg in the evening.) 90 tablet 3    montelukast (SINGULAIR) 10 MG tablet Take 10 mg by mouth Every Night.      nabumetone (RELAFEN) 750 MG tablet Daily.      pantoprazole (PROTONIX) 40 MG EC tablet Take 40 mg by mouth Daily.      potassium chloride ER (K-TAB) 20 MEQ tablet controlled-release ER tablet Take 20 mEq by mouth 2 (Two) Times a Day.      rosuvastatin (CRESTOR) 10 MG tablet TAKE ONE TABLET BY MOUTH DAILY 90 tablet 1    topiramate (TOPAMAX) 25 MG tablet Take 25 mg by mouth Daily.           PROCEDURES  Procedures            PROGRESS, DATA ANALYSIS, CONSULTS, AND MEDICAL DECISION MAKING  All labs  have been independently interpreted by me.  All radiology studies have been reviewed by me. All EKG's have been independently viewed and interpreted by me.  Discussion below represents my analysis of pertinent findings related to patient's condition, differential diagnosis, treatment plan and final disposition.    Differential diagnosis includes but is not limited to electrolyte abnormality, dehydration, postoperative effects.    Clinical Scores:                   ED Course as of 05/28/24 1851   Tue May 28, 2024   1802 Workup in the emergency department is overall unremarkable outside of mild elevation of lipase of unclear significance in the setting of no abdominal pain/nausea/vomiting.  Mild elevation in creatinine.  Patient provided 1 L IV fluids.  Patient had a couple episodes of low blood pressure however that improved significantly with administration of fluids.  Believe patient's symptoms are secondary to dehydration versus postoperative effects.  Believe patient is appropriate for discharge home at this time to follow-up on outpatient basis with primary care and surgery.  Patient is agreeable.  Patient discharged in stable condition. [MW]      ED Course User Index  [MW] Vinicio Moore MD             AS OF 18:51 EDT VITALS:    BP - 98/87  HR - 91  TEMP - 97.4 °F (36.3 °C) (Tympanic)  O2 SATS - 96%    COMPLEXITY OF CARE  Admission was considered but after careful review of the patient's presentation, physical examination, diagnostic results, and response to treatment the patient may be safely discharged with outpatient follow-up.      DIAGNOSIS  Final diagnoses:   Weakness         DISPOSITION  ED Disposition       ED Disposition   Discharge    Condition   Stable    Comment   --                Please note that portions of this document were completed with a voice recognition program.    Note Disclaimer: At [facility], we believe that sharing information builds trust and better relationships. You are  receiving this note because you recently visited [facility]. It is possible you will see health information before a provider has talked with you about it. This kind of information can be easy to misunderstand. To help you fully understand what it means for your health, we urge you to discuss this note with your provider.         Vinicio Moore MD  05/28/24 2361

## 2024-05-28 NOTE — DISCHARGE INSTRUCTIONS
Orthopaedics and [address] [place] [address] DR JOHN MORRISON                   Physical Therapy Treatment Note/ Progress Report:           Date:  2021    Patient Name:  Zuleika Koenig    :  1993  MRN: [id number]  Restrictions/Precautions:    Medical/Treatment Diagnosis Information:  · Diagnosis: healing left ankle fracture. M25.572 (ICD-10-CM) - Acute left ankle pain. Onset-2020  · Treatment Diagnosis: M25.572 (ICD-10-CM) - Acute left ankle pain  Insurance/Certification information:  PT Insurance Information: [employer]  Physician Information:  Referring Practitioner: Ludy Baltazar  Has the plan of care been signed (Y/N):        []  Yes  [x]  No     Date of Patient follow up with Physician: 15 Feb 21      Is this a Progress Report:     []  Yes  [x]  No        If Yes:  Date Range for reporting period:  Beginning 21  Ending    Progress report will be due (10 Rx or 30 days whichever is less): visit 10 or       Recertification will be due (POC Duration  / 90 days whichever is less): see above        Visit # Insurance Allowable Auth Required   2 60 []  Yes [x]  No        Functional Scale: UEFI-52.5%   Date assessed: 2021    Latex Allergy:  [x]NO      []YES  Preferred Language for Healthcare:   [x]English       []other:    Pain level: 0/10    SUBJECTIVE:  He started walking without the boot some in the house a couple of days ago.       OBJECTIVE: See eval   Observation:    Test measurements:       ROM PROM AROM Comments    Left Right Left Right    Dorsiflexion        Plantarflexion        Inversion        Eversion                Knee flexion        Knee extension                  Strength Left Right Comments   Dorsiflexion      Plantarflexion      Inversion      Eversion          Girth Left Right Comments   Figure 8      Transmalleolar      MTP                    Balance-SL Left Right   EO      EC     EO foam     EC foam                RESTRICTIONS/PRECAUTIONS: allergic to Please follow-up with your primary care physician within 1 to 2 weeks for repeat evaluation    Please follow-up with your ENT for repeat evaluation    Please return to the ER with new or worsening symptoms including but not limited to uncontrolled pain, fevers, chills, lightheadedness, dizziness, chest pain, shortness of breath   peanuts    Exercises/Interventions:     Exercise/Equipment Resistance/Repetitions Other comments   ROM     ABC'S 1x    BAPS 30x 4 way L3    Circles 30x CW/CCW   Ankle Pumps 30x    Inversion/Eversion 30x         Rocks     Towels     Toe curls     Bottle Roll               Stretching     Towel Pull 1    Towel Pull 2    Calf 1     Calf 2     Incline Stretch 5x:30 gastroc and soleus   Stair Stretch     Pro-Stretch     Toe Extension     ERMI          Hamstring                         Isometrics     Dorsiflexion    Plantarflexion    Inversion    Eversion         PRE's     Dorsiflexion 3x10 blue    Plantarflexion 3x10 black    Inversion 3x10 blue    Eversion 3x10 blue         SLR flex     SLR ABD     SLR ADD     SLR ext          Calf Raises     Calf Raises off step          Soleus Calf raises               Step Up          Knee Extension     Hamstring Curls               Leg Press               Balance:     Rocker Board     BOSU     SLS     Aeromat     Foam Roll     Plyoback     Tandem Stance     Biodex 2' PS L10  2x LOS L10         Bike 5' L2    Treadmill          Cone Walks 5x without boot Cuing for proper form                        Access Code: [email or web address]  URL: [email or web address]  Date: 01/27/2021  Prepared by: Fadia Crowena    Exercises  Supine Ankle Pumps - 10 reps - 3 sets - 2x daily - 7x weekly  Supine Ankle Inversion Eversion AROM - 10 reps - 3 sets - 2x daily - 7x weekly  Supine Ankle Circles - 10 reps - 3 sets - 2x daily - 7x weekly  Seated Ankle Alphabet - 10 reps - 3 sets - 2x daily - 7x weekly  Gastroc Stretch on Wall - 5 sets - 30 hold - 2x daily - 7x weekly  Soleus Stretch on Wall - 5 sets - 30 hold - 2x daily - 7x weekly  Long Sitting Ankle Dorsiflexion with Anchored Resistance - 10 reps - 3 sets - 2x daily - 7x weekly  Long Sitting Ankle Plantar Flexion with Resistance - 10 reps - 3 sets - 2x daily - 7x weekly  Long Sitting Ankle Inversion with Resistance - 10 reps - 3 sets - 2x daily - 7x restriction. [] Progressing: [] Met: [] Not Met: [] Adjusted  5. Pt will report pain at worst less than or equal to 2/10. [] Progressing: [] Met: [] Not Met: [] Adjusted   5. Pt will return to sport related activities without c/o. [] Progressing: [] Met: [] Not Met: [] Adjusted    Overall Progression Towards Functional goals/ Treatment Progress Update:  [] Patient is progressing as expected towards functional goals listed. [] Progression is slowed due to complexities/Impairments listed. [] Progression has been slowed due to co-morbidities. [x] Plan just implemented, too soon to assess goals progression <30days   [] Goals require adjustment due to lack of progress  [] Patient is not progressing as expected and requires additional follow up with physician  [] Other    Prognosis for POC: [x] Good [] Fair  [] Poor      Patient requires continued skilled intervention: [x] Yes  [] No    Treatment/Activity Tolerance:  [x] Patient able to complete treatment  [] Patient limited by fatigue  [] Patient limited by pain     [] Patient limited by other medical complications  [] Other: Pt adam tx well. He did have a misstep coming off the biodex with some pain. I've asked him to continue wearing the boot out of the house. I've gone over guidance for being out of the boot (ie no limping, pain, or swelling). He is understanding. He does feel he can go back to work with his boot on. I will relay this message to the MD office. Pt would continue to benefit from skilled PT to improve strength, ROM, and function. PLAN: If pt doesn't return, this note can be considered a D/C note. Progress gt, balance, and strength.    [x] Continue per plan of care [] Alter current plan (see comments above)  [] Plan of care initiated [] Hold pending MD visit [] Discharge  Electronically signed by: Sera Sanchez DPT [id number]

## 2024-08-05 ENCOUNTER — HOSPITAL ENCOUNTER (EMERGENCY)
Facility: HOSPITAL | Age: 49
Discharge: HOME | End: 2024-08-05
Attending: EMERGENCY MEDICINE | Admitting: EMERGENCY MEDICINE
Payer: MEDICARE

## 2024-08-05 ENCOUNTER — APPOINTMENT (OUTPATIENT)
Dept: CT IMAGING | Facility: HOSPITAL | Age: 49
End: 2024-08-05
Payer: MEDICARE

## 2024-08-05 VITALS
DIASTOLIC BLOOD PRESSURE: 95 MMHG | HEIGHT: 63 IN | HEART RATE: 103 BPM | TEMPERATURE: 98.2 F | WEIGHT: 138.01 LBS | BODY MASS INDEX: 24.45 KG/M2 | OXYGEN SATURATION: 96 % | SYSTOLIC BLOOD PRESSURE: 115 MMHG | RESPIRATION RATE: 18 BRPM

## 2024-08-05 DIAGNOSIS — K52.9 CHRONIC DIARRHEA: ICD-10-CM

## 2024-08-05 DIAGNOSIS — R10.9 ABDOMINAL PAIN, UNSPECIFIED ABDOMINAL LOCATION: Primary | ICD-10-CM

## 2024-08-05 DIAGNOSIS — N28.1 RENAL CYST, LEFT: ICD-10-CM

## 2024-08-05 DIAGNOSIS — E87.6 HYPOKALEMIA: ICD-10-CM

## 2024-08-05 LAB
ALBUMIN SERPL-MCNC: 4.8 G/DL (ref 3.5–5.2)
ALBUMIN/GLOB SERPL: 1.6 G/DL
ALP SERPL-CCNC: 72 U/L (ref 39–117)
ALT SERPL W P-5'-P-CCNC: 12 U/L (ref 1–33)
ANION GAP SERPL CALCULATED.3IONS-SCNC: 9.5 MMOL/L (ref 5–15)
AST SERPL-CCNC: 21 U/L (ref 1–32)
BACTERIA UR QL AUTO: ABNORMAL /HPF
BASOPHILS # BLD AUTO: 0.1 10*3/MM3 (ref 0–0.2)
BASOPHILS NFR BLD AUTO: 1.5 % (ref 0–1.5)
BILIRUB SERPL-MCNC: 0.6 MG/DL (ref 0–1.2)
BILIRUB UR QL STRIP: NEGATIVE
BUN SERPL-MCNC: 13 MG/DL (ref 6–20)
BUN/CREAT SERPL: 12.6 (ref 7–25)
CALCIUM SPEC-SCNC: 10.7 MG/DL (ref 8.6–10.5)
CHLORIDE SERPL-SCNC: 105 MMOL/L (ref 98–107)
CLARITY UR: ABNORMAL
CO2 SERPL-SCNC: 24.5 MMOL/L (ref 22–29)
COLOR UR: YELLOW
CREAT SERPL-MCNC: 1.03 MG/DL (ref 0.57–1)
DEPRECATED RDW RBC AUTO: 42.9 FL (ref 37–54)
EGFRCR SERPLBLD CKD-EPI 2021: 66.4 ML/MIN/1.73
EOSINOPHIL # BLD AUTO: 0.47 10*3/MM3 (ref 0–0.4)
EOSINOPHIL NFR BLD AUTO: 7 % (ref 0.3–6.2)
ERYTHROCYTE [DISTWIDTH] IN BLOOD BY AUTOMATED COUNT: 12.3 % (ref 12.3–15.4)
GLOBULIN UR ELPH-MCNC: 3 GM/DL
GLUCOSE SERPL-MCNC: 91 MG/DL (ref 65–99)
GLUCOSE UR STRIP-MCNC: NEGATIVE MG/DL
HCG SERPL QL: NEGATIVE
HCT VFR BLD AUTO: 43.2 % (ref 34–46.6)
HGB BLD-MCNC: 14.5 G/DL (ref 12–15.9)
HGB UR QL STRIP.AUTO: NEGATIVE
HYALINE CASTS UR QL AUTO: ABNORMAL /LPF
IMM GRANULOCYTES # BLD AUTO: 0.02 10*3/MM3 (ref 0–0.05)
IMM GRANULOCYTES NFR BLD AUTO: 0.3 % (ref 0–0.5)
KETONES UR QL STRIP: NEGATIVE
LEUKOCYTE ESTERASE UR QL STRIP.AUTO: ABNORMAL
LIPASE SERPL-CCNC: 99 U/L (ref 13–60)
LYMPHOCYTES # BLD AUTO: 1.63 10*3/MM3 (ref 0.7–3.1)
LYMPHOCYTES NFR BLD AUTO: 24.4 % (ref 19.6–45.3)
MCH RBC QN AUTO: 31.5 PG (ref 26.6–33)
MCHC RBC AUTO-ENTMCNC: 33.6 G/DL (ref 31.5–35.7)
MCV RBC AUTO: 93.7 FL (ref 79–97)
MONOCYTES # BLD AUTO: 0.44 10*3/MM3 (ref 0.1–0.9)
MONOCYTES NFR BLD AUTO: 6.6 % (ref 5–12)
NEUTROPHILS NFR BLD AUTO: 4.01 10*3/MM3 (ref 1.7–7)
NEUTROPHILS NFR BLD AUTO: 60.2 % (ref 42.7–76)
NITRITE UR QL STRIP: NEGATIVE
NRBC BLD AUTO-RTO: 0 /100 WBC (ref 0–0.2)
PH UR STRIP.AUTO: 6.5 [PH] (ref 5–8)
PLATELET # BLD AUTO: 255 10*3/MM3 (ref 140–450)
PMV BLD AUTO: 11.3 FL (ref 6–12)
POTASSIUM SERPL-SCNC: 3.2 MMOL/L (ref 3.5–5.2)
PROT SERPL-MCNC: 7.8 G/DL (ref 6–8.5)
PROT UR QL STRIP: ABNORMAL
RBC # BLD AUTO: 4.61 10*6/MM3 (ref 3.77–5.28)
RBC # UR STRIP: ABNORMAL /HPF
REF LAB TEST METHOD: ABNORMAL
SODIUM SERPL-SCNC: 139 MMOL/L (ref 136–145)
SP GR UR STRIP: 1.02 (ref 1–1.03)
SQUAMOUS #/AREA URNS HPF: ABNORMAL /HPF
UROBILINOGEN UR QL STRIP: ABNORMAL
WBC # UR STRIP: ABNORMAL /HPF
WBC NRBC COR # BLD AUTO: 6.67 10*3/MM3 (ref 3.4–10.8)

## 2024-08-05 PROCEDURE — 84703 CHORIONIC GONADOTROPIN ASSAY: CPT | Performed by: PHYSICIAN ASSISTANT

## 2024-08-05 PROCEDURE — 94760 N-INVAS EAR/PLS OXIMETRY 1: CPT

## 2024-08-05 PROCEDURE — 25510000001 IOPAMIDOL 61 % SOLUTION: Performed by: EMERGENCY MEDICINE

## 2024-08-05 PROCEDURE — 25810000003 SODIUM CHLORIDE 0.9 % SOLUTION: Performed by: PHYSICIAN ASSISTANT

## 2024-08-05 PROCEDURE — 80053 COMPREHEN METABOLIC PANEL: CPT | Performed by: PHYSICIAN ASSISTANT

## 2024-08-05 PROCEDURE — 74177 CT ABD & PELVIS W/CONTRAST: CPT

## 2024-08-05 PROCEDURE — 85025 COMPLETE CBC W/AUTO DIFF WBC: CPT | Performed by: PHYSICIAN ASSISTANT

## 2024-08-05 PROCEDURE — 83690 ASSAY OF LIPASE: CPT | Performed by: PHYSICIAN ASSISTANT

## 2024-08-05 PROCEDURE — 94799 UNLISTED PULMONARY SVC/PX: CPT

## 2024-08-05 PROCEDURE — 99285 EMERGENCY DEPT VISIT HI MDM: CPT

## 2024-08-05 PROCEDURE — 81001 URINALYSIS AUTO W/SCOPE: CPT | Performed by: PHYSICIAN ASSISTANT

## 2024-08-05 PROCEDURE — 94640 AIRWAY INHALATION TREATMENT: CPT

## 2024-08-05 PROCEDURE — 94664 DEMO&/EVAL PT USE INHALER: CPT

## 2024-08-05 PROCEDURE — 94761 N-INVAS EAR/PLS OXIMETRY MLT: CPT

## 2024-08-05 RX ORDER — ALBUTEROL SULFATE 2.5 MG/3ML
2.5 SOLUTION RESPIRATORY (INHALATION) ONCE
Status: COMPLETED | OUTPATIENT
Start: 2024-08-05 | End: 2024-08-05

## 2024-08-05 RX ORDER — SODIUM CHLORIDE 0.9 % (FLUSH) 0.9 %
10 SYRINGE (ML) INJECTION AS NEEDED
Status: DISCONTINUED | OUTPATIENT
Start: 2024-08-05 | End: 2024-08-05 | Stop reason: HOSPADM

## 2024-08-05 RX ORDER — POTASSIUM CHLORIDE 750 MG/1
40 TABLET, FILM COATED, EXTENDED RELEASE ORAL ONCE
Status: COMPLETED | OUTPATIENT
Start: 2024-08-05 | End: 2024-08-05

## 2024-08-05 RX ADMIN — SODIUM CHLORIDE 1000 ML: 9 INJECTION, SOLUTION INTRAVENOUS at 08:41

## 2024-08-05 RX ADMIN — POTASSIUM CHLORIDE 40 MEQ: 750 TABLET, EXTENDED RELEASE ORAL at 12:24

## 2024-08-05 RX ADMIN — ALBUTEROL SULFATE 2.5 MG: 2.5 SOLUTION RESPIRATORY (INHALATION) at 10:24

## 2024-08-05 RX ADMIN — IOPAMIDOL 85 ML: 612 INJECTION, SOLUTION INTRAVENOUS at 09:46

## 2024-08-05 NOTE — ED PROVIDER NOTES
EMERGENCY DEPARTMENT MD ATTESTATION NOTE    Room Number:  08/08  PCP: Orin Lundberg MD  Independent Historians: Patient    HPI:  A complete HPI/ROS/PMH/PSH/SH/FH are unobtainable due to: None    Chronic or social conditions impacting patient care (Social Determinants of Health): None      Context: Selma SMART is a 48 y.o. female with a medical history of IBS, GERD, hearing loss who presents to the ED c/o acute abdominal pain and diarrhea.  The patient reports she has had chronic right upper quadrant abdominal pain and chronic diarrhea.  She reports 5 days ago she was started on Augmentin for an ear infection on the left.  She reports since then she has had worsened right upper quadrant abdominal pain and diarrhea.  She denies fevers.  She denies any nausea or vomiting.  She states that her stool has changed in caliber as well as color.  She thinks it might have been spots of blood in it few days ago.  She denies any gross blood.  She has a gastroenterologist that she follows with.        Review of prior external notes (non-ED) -and- Review of prior external test results outside of this encounter:  Laboratory evaluation 5/28/2024 shows normal CBC, normal CMP    Prescription drug monitoring program review:           PHYSICAL EXAM    I have reviewed the triage vital signs and nursing notes.    ED Triage Vitals   Temp Heart Rate Resp BP SpO2   08/05/24 0734 08/05/24 0734 08/05/24 0734 08/05/24 0740 08/05/24 0734   98.2 °F (36.8 °C) (!) 139 18 122/80 97 %      Temp src Heart Rate Source Patient Position BP Location FiO2 (%)   -- -- -- -- --              Physical Exam  GENERAL: Awake, alert, no acute distress  SKIN: Warm, dry  HENT: Normocephalic, atraumatic  EYES: no scleral icterus  CV: regular rhythm, regular rate  RESPIRATORY: normal effort, lungs clear  ABDOMEN: soft, mild right upper quadrant tenderness, nondistended  MUSCULOSKELETAL: no deformity  NEURO: alert, moves all extremities, follows  commands            MEDICATIONS GIVEN IN ER  Medications   sodium chloride 0.9 % flush 10 mL (has no administration in time range)   sodium chloride 0.9 % bolus 1,000 mL (0 mL Intravenous Stopped 8/5/24 1008)   iopamidol (ISOVUE-300) 61 % injection 100 mL (85 mL Intravenous Given by Other 8/5/24 0946)   albuterol (PROVENTIL) nebulizer solution 0.083% 2.5 mg/3mL (2.5 mg Nebulization Given 8/5/24 1024)   potassium chloride (K-DUR,KLOR-CON) ER tablet 40 mEq (40 mEq Oral Given 8/5/24 1224)         ORDERS PLACED DURING THIS VISIT:  Orders Placed This Encounter   Procedures    CT Abdomen Pelvis With Contrast    Comprehensive Metabolic Panel    Lipase    hCG, Serum, Qualitative    Urinalysis With Microscopic If Indicated (No Culture) - Urine, Clean Catch    CBC Auto Differential    Urinalysis, Microscopic Only - Urine, Clean Catch    Patient Isolation Contact Spore    Insert Peripheral IV    CBC & Differential         PROCEDURES  Procedures            PROGRESS, DATA ANALYSIS, CONSULTS, AND MEDICAL DECISION MAKING  All labs have been independently interpreted by me.  All radiology studies have been reviewed by me. All EKG's have been independently viewed and interpreted by me.  Discussion below represents my analysis of pertinent findings related to patient's condition, differential diagnosis, treatment plan and final disposition.    Differential diagnosis includes but is not limited to pancreatitis, infectious diarrhea, chronic abdominal pain, acute abdominal pain, IBS, bacterial overgrowth.    Clinical Scores:                   ED Course as of 08/05/24 1425   Mon Aug 05, 2024   0750 Patient presents with acute on chronic abdominal pain, diarrhea.  She denies any fevers or chills.  Plan for stool cultures, CT of the abdomen, basic labs. [EE]   0947 CT Abdomen Pelvis With Contrast  My independent interpretation of the imaging study is no bowel obstruction.  [TR]   0956 Creatinine(!): 1.03 [EE]   0956 WBC: 6.67 [EE]   0956  Lipase(!): 99 [EE]   1124 Patient has a fairly benign workup here.  We will treat her hypokalemia.  She has been unable to provide us a diarrhea sample.  She does have an appointment with her primary care doctor this week, as well as a GI appointment next month.  We will discharge. [EE]      ED Course User Index  [EE] Iggy Antoine PA  [TR] Alberto Kam MD       MDM: I suspect the patient's symptoms are all related to chronic abdominal pain in the setting of acute worsening with antibiotic use.  Plan laboratory evaluation and CT of the abdomen pelvis.  We will rule out acute causes such as pancreatitis, liver dysfunction, obstruction.  We will evaluate for anemia with measuring hemoglobin to rule out GI bleed.      COMPLEXITY OF CARE  Admission was considered but after careful review of the patient's presentation, physical examination, diagnostic results, and response to treatment the patient may be safely discharged with outpatient follow-up.    Please note that portions of this document were completed with a voice recognition program.    Note Disclaimer: At [facility], we believe that sharing information builds trust and better relationships. You are receiving this note because you recently visited [facility]. It is possible you will see health information before a provider has talked with you about it. This kind of information can be easy to misunderstand. To help you fully understand what it means for your health, we urge you to discuss this note with your provider.         Alberto Kam MD  08/05/24 5350

## 2024-08-05 NOTE — ED TRIAGE NOTES
Patient to ED via pv from home. Patient c/o RUQ abdominal pain that is worse at night. Patient also reports loose, yellowish stools that she has had for four days. Patient reports she thinks she saw blood in her stool yesterday.

## 2024-08-05 NOTE — ED PROVIDER NOTES
EMERGENCY DEPARTMENT ENCOUNTER    Room Number:  08/08  Date of encounter:  8/5/2024  PCP: Orin Lundberg MD  Historian: Patient  Chronic or social conditions impacting care (social determinants of health): None    HPI:  Chief Complaint: Abdominal pain, diarrhea  A complete HPI/ROS/PMH/PSH/SH/FH are unobtainable due to: Nothing    Context: Selma SMART is a 48 y.o. female with a history of hypothyroidism, hypertension, IBS with chronic diarrhea, who presents to the ED c/o acute right-sided abdominal pain, diarrhea.  Patient complains of mid right abdominal pain which has been intermittent over the past several days.  She had her gallbladder out several years ago.  She denies any fever or vomiting.  She states her stools have been in a odd color the past several days.  She does have chronic daily diarrhea which is unchanged.  Patient did start Augmentin for an ear infection 4 days ago.    Review of prior external notes (non-ED):   I reviewed last GI office visit from 7/19/2023.  Patient follows with Dr. Sanchez for IBS, GERD.    Review of prior external test results outside of this encounter:  I reviewed a CMP from 6/27/2024.  Potassium 3.9, creatinine 0.80    PAST MEDICAL HISTORY  Active Ambulatory Problems     Diagnosis Date Noted    Acquired hypothyroidism 04/17/2017    Gastroesophageal reflux disease without esophagitis 04/17/2017    Encounter for long-term (current) use of NSAIDs 04/17/2017    Depression 04/17/2017    History of DVT (deep vein thrombosis) 04/17/2017    Arthritis 04/17/2017    ZAINA (generalized anxiety disorder) 04/17/2017    Essential hypertension 04/17/2017    Slow transit constipation 04/17/2017    Menstrual irregularity 04/17/2017    Medicare annual wellness visit, initial 04/17/2017    Dizziness 04/17/2017    Bilateral hearing loss 04/17/2017    Vitamin D deficiency 04/17/2017    Hashimoto's thyroiditis 04/17/2017    Anemia 04/17/2017    Pain and swelling of right lower leg 04/17/2017     Thyroid nodule 2017    Hypercoagulable state 2017    Impacted cerumen of both ears 2017    Chest pain, atypical 2020    Seasonal allergies 2024     Resolved Ambulatory Problems     Diagnosis Date Noted    No Resolved Ambulatory Problems     Past Medical History:   Diagnosis Date    Anxiety     Colon polyp     GERD (gastroesophageal reflux disease)     HL (hearing loss)     Hyperlipidemia     Hypertension     Hyperthyroidism     Hypothyroidism     Inflammatory bowel disease     Pain in left leg 2020    Pain in right leg 2020    Peptic ulceration     Radiculopathy, lumbar region 2020    Raynaud's syndrome without gangrene          PAST SURGICAL HISTORY  Past Surgical History:   Procedure Laterality Date    COSMETIC SURGERY      ENDOMETRIAL ABLATION      FOOT SURGERY      HERNIA REPAIR      OTHER SURGICAL HISTORY      surgery    VEIN SURGERY      WISDOM TOOTH EXTRACTION  1988         FAMILY HISTORY  Family History   Problem Relation Age of Onset    Diabetes Mother     Depression Mother     Lung cancer Mother     Hyperlipidemia Father     Heart disease Father     Other Other         blood clots         SOCIAL HISTORY  Social History     Socioeconomic History    Marital status: Single   Tobacco Use    Smoking status: Former     Current packs/day: 0.00     Average packs/day: 0.5 packs/day for 15.0 years (7.5 ttl pk-yrs)     Types: Cigarettes     Start date:      Quit date: 2016     Years since quittin.6    Smokeless tobacco: Never   Vaping Use    Vaping status: Unknown   Substance and Sexual Activity    Alcohol use: No    Drug use: No    Sexual activity: Not Currently         ALLERGIES  Gabapentin, Hydrocodone, Pineapple, Pregabalin, Amitriptyline, Codeine, Metoprolol, Amlodipine, and Prednisone        REVIEW OF SYSTEMS  All systems reviewed and negative except for those discussed in HPI.       PHYSICAL EXAM    I have reviewed the triage vital signs and nursing  notes.    ED Triage Vitals   Temp Heart Rate Resp BP SpO2   08/05/24 0734 08/05/24 0734 08/05/24 0734 08/05/24 0740 08/05/24 0734   98.2 °F (36.8 °C) (!) 139 18 122/80 97 %      Temp src Heart Rate Source Patient Position BP Location FiO2 (%)   -- -- -- -- --              Physical Exam  GENERAL: Alert, oriented, well-appearing, not distressed  HENT: head atraumatic, no nuchal rigidity  EYES: no scleral icterus, EOMI  CV: regular rhythm, regular rate, no murmur  RESPIRATORY: normal effort, CTA  ABDOMEN: soft, mild diffuse right-sided abdominal tenderness without guarding or rebound.  MUSCULOSKELETAL: no deformity, FROM, no calf swelling or tenderness  NEURO: alert, moves all extremities, follows commands  SKIN: warm, dry        LAB RESULTS  Recent Results (from the past 24 hour(s))   Comprehensive Metabolic Panel    Collection Time: 08/05/24  8:40 AM    Specimen: Arm, Left; Blood   Result Value Ref Range    Glucose 91 65 - 99 mg/dL    BUN 13 6 - 20 mg/dL    Creatinine 1.03 (H) 0.57 - 1.00 mg/dL    Sodium 139 136 - 145 mmol/L    Potassium 3.2 (L) 3.5 - 5.2 mmol/L    Chloride 105 98 - 107 mmol/L    CO2 24.5 22.0 - 29.0 mmol/L    Calcium 10.7 (H) 8.6 - 10.5 mg/dL    Total Protein 7.8 6.0 - 8.5 g/dL    Albumin 4.8 3.5 - 5.2 g/dL    ALT (SGPT) 12 1 - 33 U/L    AST (SGOT) 21 1 - 32 U/L    Alkaline Phosphatase 72 39 - 117 U/L    Total Bilirubin 0.6 0.0 - 1.2 mg/dL    Globulin 3.0 gm/dL    A/G Ratio 1.6 g/dL    BUN/Creatinine Ratio 12.6 7.0 - 25.0    Anion Gap 9.5 5.0 - 15.0 mmol/L    eGFR 66.4 >60.0 mL/min/1.73   Lipase    Collection Time: 08/05/24  8:40 AM    Specimen: Arm, Left; Blood   Result Value Ref Range    Lipase 99 (H) 13 - 60 U/L   hCG, Serum, Qualitative    Collection Time: 08/05/24  8:40 AM    Specimen: Arm, Left; Blood   Result Value Ref Range    HCG Qualitative Negative Negative   Urinalysis With Microscopic If Indicated (No Culture) - Urine, Clean Catch    Collection Time: 08/05/24  8:40 AM    Specimen:  Urine, Clean Catch   Result Value Ref Range    Color, UA Yellow Yellow, Straw    Appearance, UA Cloudy (A) Clear    pH, UA 6.5 5.0 - 8.0    Specific Gravity, UA 1.018 1.005 - 1.030    Glucose, UA Negative Negative    Ketones, UA Negative Negative    Bilirubin, UA Negative Negative    Blood, UA Negative Negative    Protein, UA Trace (A) Negative    Leuk Esterase, UA Trace (A) Negative    Nitrite, UA Negative Negative    Urobilinogen, UA 0.2 E.U./dL 0.2 - 1.0 E.U./dL   CBC Auto Differential    Collection Time: 08/05/24  8:40 AM    Specimen: Arm, Left; Blood   Result Value Ref Range    WBC 6.67 3.40 - 10.80 10*3/mm3    RBC 4.61 3.77 - 5.28 10*6/mm3    Hemoglobin 14.5 12.0 - 15.9 g/dL    Hematocrit 43.2 34.0 - 46.6 %    MCV 93.7 79.0 - 97.0 fL    MCH 31.5 26.6 - 33.0 pg    MCHC 33.6 31.5 - 35.7 g/dL    RDW 12.3 12.3 - 15.4 %    RDW-SD 42.9 37.0 - 54.0 fl    MPV 11.3 6.0 - 12.0 fL    Platelets 255 140 - 450 10*3/mm3    Neutrophil % 60.2 42.7 - 76.0 %    Lymphocyte % 24.4 19.6 - 45.3 %    Monocyte % 6.6 5.0 - 12.0 %    Eosinophil % 7.0 (H) 0.3 - 6.2 %    Basophil % 1.5 0.0 - 1.5 %    Immature Grans % 0.3 0.0 - 0.5 %    Neutrophils, Absolute 4.01 1.70 - 7.00 10*3/mm3    Lymphocytes, Absolute 1.63 0.70 - 3.10 10*3/mm3    Monocytes, Absolute 0.44 0.10 - 0.90 10*3/mm3    Eosinophils, Absolute 0.47 (H) 0.00 - 0.40 10*3/mm3    Basophils, Absolute 0.10 0.00 - 0.20 10*3/mm3    Immature Grans, Absolute 0.02 0.00 - 0.05 10*3/mm3    nRBC 0.0 0.0 - 0.2 /100 WBC   Urinalysis, Microscopic Only - Urine, Clean Catch    Collection Time: 08/05/24  8:40 AM    Specimen: Urine, Clean Catch   Result Value Ref Range    RBC, UA 0-2 None Seen, 0-2 /HPF    WBC, UA 0-2 None Seen, 0-2 /HPF    Bacteria, UA None Seen None Seen /HPF    Squamous Epithelial Cells, UA 21-30 (A) None Seen, 0-2 /HPF    Hyaline Casts, UA 3-6 None Seen /LPF    Methodology Automated Microscopy        Ordered the above labs and independently reviewed the  results.        RADIOLOGY  CT Abdomen Pelvis With Contrast    Result Date: 8/5/2024  CT ABDOMEN AND PELVIS WITH IV CONTRAST  HISTORY: 48-year-old female with right upper quadrant pain. Cholecystectomy in the past.  TECHNIQUE: Radiation dose reduction techniques were utilized, including automated exposure control and exposure modulation based on body size. 3 mm images were obtained through the abdomen and pelvis after the administration of IV contrast. No priors for comparison.  FINDINGS: 1. The liver appears unremarkable and there is no biliary dilatation. Spleen, pancreas, adrenals, and right kidney appear unremarkable other than 2 subcentimeter low-attenuation foci within the right kidney which likely represent tiny cysts. 2. There are a few tiny left renal cysts as well as a slightly hyperdense 1.2 cm nodule at the lower pole with 16 Hounsfield internal measurements, likely a proteinaceous cyst. There are also 3 punctate nonobstructing stones at the lower pole of the left kidney. For followup of the left renal cysts, a followup renal ultrasound is recommended in 12 months. 3. No acute bowel abnormality is seen. There is no evidence for colitis or appendicitis. There is an IUD centrally within the uterus. Uterus and adnexa appear unremarkable for the patient's age and there is a tiny amount of free fluid in the cul-de-sac which is within physiological range. 4. Ventral hernia repair changes are noted. No free fluid or lymphadenopathy. There are mild abdominal aortic atherosclerotic changes without aneurysmal dilatation.       I ordered the above noted radiological studies. Reviewed by me and discussed with radiologist.  See dictation for official radiology interpretation.      MEDICATIONS GIVEN IN ER    Medications   sodium chloride 0.9 % bolus 1,000 mL (0 mL Intravenous Stopped 8/5/24 1008)   iopamidol (ISOVUE-300) 61 % injection 100 mL (85 mL Intravenous Given by Other 8/5/24 7372)   albuterol (PROVENTIL)  nebulizer solution 0.083% 2.5 mg/3mL (2.5 mg Nebulization Given 8/5/24 1024)   potassium chloride (K-DUR,KLOR-CON) ER tablet 40 mEq (40 mEq Oral Given 8/5/24 1224)         ADDITIONAL ORDERS CONSIDERED BUT NOT ORDERED:  Admission was considered but after careful review of the patient's presentation, physical examination, diagnostic results, and response to treatment the patient may be safely discharged with outpatient follow-up.       PROGRESS, DATA ANALYSIS, CONSULTS, AND MEDICAL DECISION MAKING    All labs have been independently interpreted by myself.  All radiology studies have been independently interpreted by myself and discussed with radiologist dictating the report.   EKG's independently interpreted by myself.  Discussion below represents my analysis of pertinent findings related to patient's condition, differential diagnosis, treatment plan and final disposition.    I have discussed case with Dr. Kam, emergency room physician.  He has performed his own bedside examination and agrees with treatment plan.    ED Course as of 08/05/24 1500   Mon Aug 05, 2024   0750 Patient presents with acute on chronic abdominal pain, diarrhea.  She denies any fevers or chills.  Plan for stool cultures, CT of the abdomen, basic labs. [EE]   0947 CT Abdomen Pelvis With Contrast  My independent interpretation of the imaging study is no bowel obstruction.  [TR]   0956 Creatinine(!): 1.03 [EE]   0956 WBC: 6.67 [EE]   0956 Lipase(!): 99 [EE]   1124 Patient has a fairly benign workup here.  We will treat her hypokalemia.  She has been unable to provide us a diarrhea sample.  She does have an appointment with her primary care doctor this week, as well as a GI appointment next month.  We will discharge. [EE]      ED Course User Index  [EE] Iggy Antoine PA  [TR] Alberto Kam MD       AS OF 15:00 EDT VITALS:    BP - 115/95  HR - 103  TEMP - 98.2 °F (36.8 °C)  O2 SATS - 96%        DIAGNOSIS  Final diagnoses:   Abdominal pain,  unspecified abdominal location   Chronic diarrhea   Hypokalemia   Renal cyst, left         DISPOSITION  Discharged    Admission was considered but after careful review of the patient's presentation, physical examination, diagnostic results, and response to treatment the patient may be safely discharged with outpatient follow-up.         Dictated utilizing Dragon dictation     Iggy Antoine PA  08/05/24 1500

## 2024-08-06 ENCOUNTER — TELEPHONE (OUTPATIENT)
Dept: GASTROENTEROLOGY | Facility: CLINIC | Age: 49
End: 2024-08-06
Payer: MEDICARE

## 2024-08-06 NOTE — TELEPHONE ENCOUNTER
TRC to schedule a new patient appointment with Loraine for possibly 10/03/2024 or later. Pt needs to be seen for IBS-D and GERD.

## 2024-08-08 ENCOUNTER — TELEPHONE (OUTPATIENT)
Dept: GASTROENTEROLOGY | Facility: CLINIC | Age: 49
End: 2024-08-08
Payer: MEDICARE

## 2024-08-08 NOTE — TELEPHONE ENCOUNTER
Called and LVM explaining 2nd opinion protocol and explaining that we are not currently accepting new patients/transfer of care at this time.   Patient is already scheduled with Letha Oropeza. I left that offices number for the patient to reach out and see if they have any sooner appointments to offer vs the one she is already scheduled for to see Loraine Barnes.     Ok for HUB to relay.    Post-Care Instructions: I reviewed with the patient in detail post-care instructions. Patient is not to engage in any heavy lifting, exercise, or swimming for the next 14 days. Should the patient develop any fevers, chills, bleeding, severe pain patient will contact the office immediately.

## 2024-08-08 NOTE — TELEPHONE ENCOUNTER
----- Message from Loraine THEODORE sent at 8/8/2024  8:36 AM EDT -----  Regarding: FW: Appointment on 8/12 with [facility]  Contact: [phone]  [facility]  ----- Message -----  From: Selma SMART  Sent: 8/7/2024   8:59 AM EDT  To: [facility] [place]  Subject: Appointment on 8/12 with [facility]    You guys are making this very difficult for me.

## 2024-09-04 ENCOUNTER — OFFICE VISIT (OUTPATIENT)
Dept: FAMILY MEDICINE CLINIC | Facility: CLINIC | Age: 49
End: 2024-09-04
Payer: MEDICARE

## 2024-09-04 ENCOUNTER — HOSPITAL ENCOUNTER (OUTPATIENT)
Dept: GENERAL RADIOLOGY | Facility: HOSPITAL | Age: 49
Discharge: HOME | End: 2024-09-04
Admitting: STUDENT IN AN ORGANIZED HEALTH CARE EDUCATION/TRAINING PROGRAM
Payer: MEDICARE

## 2024-09-04 VITALS
OXYGEN SATURATION: 100 % | HEIGHT: 63 IN | SYSTOLIC BLOOD PRESSURE: 124 MMHG | WEIGHT: 140 LBS | TEMPERATURE: 97.6 F | DIASTOLIC BLOOD PRESSURE: 78 MMHG | BODY MASS INDEX: 24.8 KG/M2 | HEART RATE: 72 BPM

## 2024-09-04 DIAGNOSIS — M47.812 CERVICAL SPONDYLOSIS: ICD-10-CM

## 2024-09-04 DIAGNOSIS — N95.1 PERIMENOPAUSAL VASOMOTOR SYMPTOMS: ICD-10-CM

## 2024-09-04 DIAGNOSIS — I73.00 RAYNAUD'S DISEASE WITHOUT GANGRENE: ICD-10-CM

## 2024-09-04 DIAGNOSIS — J30.2 SEASONAL ALLERGIES: ICD-10-CM

## 2024-09-04 DIAGNOSIS — L81.9 DISCOLORATION OF SKIN OF TOE: ICD-10-CM

## 2024-09-04 DIAGNOSIS — Z97.5 IUD CONTRACEPTION: ICD-10-CM

## 2024-09-04 DIAGNOSIS — R09.89 OTHER SPECIFIED SYMPTOMS AND SIGNS INVOLVING THE CIRCULATORY AND RESPIRATORY SYSTEMS: ICD-10-CM

## 2024-09-04 DIAGNOSIS — M47.812 CERVICAL SPONDYLOSIS: Primary | ICD-10-CM

## 2024-09-04 DIAGNOSIS — I10 ESSENTIAL HYPERTENSION: ICD-10-CM

## 2024-09-04 DIAGNOSIS — E03.9 ACQUIRED HYPOTHYROIDISM: ICD-10-CM

## 2024-09-04 PROBLEM — M79.661 PAIN AND SWELLING OF RIGHT LOWER LEG: Status: RESOLVED | Noted: 2017-04-17 | Resolved: 2024-09-04

## 2024-09-04 PROBLEM — R42 DIZZINESS: Status: RESOLVED | Noted: 2017-04-17 | Resolved: 2024-09-04

## 2024-09-04 PROBLEM — M79.89 PAIN AND SWELLING OF RIGHT LOWER LEG: Status: RESOLVED | Noted: 2017-04-17 | Resolved: 2024-09-04

## 2024-09-04 PROBLEM — N92.6 MENSTRUAL IRREGULARITY: Status: RESOLVED | Noted: 2017-04-17 | Resolved: 2024-09-04

## 2024-09-04 PROBLEM — R07.89 CHEST PAIN, ATYPICAL: Status: RESOLVED | Noted: 2020-08-12 | Resolved: 2024-09-04

## 2024-09-04 PROBLEM — H61.23 IMPACTED CERUMEN OF BOTH EARS: Status: RESOLVED | Noted: 2017-04-17 | Resolved: 2024-09-04

## 2024-09-04 PROBLEM — E06.3 HASHIMOTO'S THYROIDITIS: Status: RESOLVED | Noted: 2017-04-17 | Resolved: 2024-09-04

## 2024-09-04 PROCEDURE — 3074F SYST BP LT 130 MM HG: CPT | Performed by: STUDENT IN AN ORGANIZED HEALTH CARE EDUCATION/TRAINING PROGRAM

## 2024-09-04 PROCEDURE — 99214 OFFICE O/P EST MOD 30 MIN: CPT | Performed by: STUDENT IN AN ORGANIZED HEALTH CARE EDUCATION/TRAINING PROGRAM

## 2024-09-04 PROCEDURE — 1159F MED LIST DOCD IN RCRD: CPT | Performed by: STUDENT IN AN ORGANIZED HEALTH CARE EDUCATION/TRAINING PROGRAM

## 2024-09-04 PROCEDURE — 3078F DIAST BP <80 MM HG: CPT | Performed by: STUDENT IN AN ORGANIZED HEALTH CARE EDUCATION/TRAINING PROGRAM

## 2024-09-04 PROCEDURE — 1160F RVW MEDS BY RX/DR IN RCRD: CPT | Performed by: STUDENT IN AN ORGANIZED HEALTH CARE EDUCATION/TRAINING PROGRAM

## 2024-09-04 PROCEDURE — 72040 X-RAY EXAM NECK SPINE 2-3 VW: CPT

## 2024-09-04 RX ORDER — POTASSIUM CHLORIDE 1500 MG/1
20 TABLET, EXTENDED RELEASE ORAL 2 TIMES DAILY
COMMUNITY
End: 2024-09-04

## 2024-09-04 RX ORDER — METOPROLOL SUCCINATE 25 MG/1
25 TABLET, EXTENDED RELEASE ORAL DAILY
COMMUNITY
End: 2024-09-04 | Stop reason: SINTOL

## 2024-09-04 RX ORDER — LEVOTHYROXINE SODIUM 75 UG/1
75 TABLET ORAL DAILY
COMMUNITY

## 2024-09-04 RX ORDER — TOPIRAMATE 25 MG/1
25 TABLET, FILM COATED ORAL 2 TIMES DAILY
Status: SHIPPED | COMMUNITY
Start: 2024-09-04

## 2024-09-04 RX ORDER — LANCETS 33 GAUGE
EACH MISCELLANEOUS
COMMUNITY
Start: 2024-06-14 | End: 2024-09-04

## 2024-09-04 RX ORDER — LOSARTAN POTASSIUM 25 MG/1
12.5 TABLET ORAL DAILY
COMMUNITY

## 2024-09-04 RX ORDER — CETIRIZINE HYDROCHLORIDE 10 MG/1
10 TABLET ORAL DAILY
COMMUNITY

## 2024-09-04 RX ORDER — HYDROCHLOROTHIAZIDE 25 MG/1
25 TABLET ORAL DAILY
COMMUNITY

## 2024-09-04 RX ORDER — AZELASTINE 1 MG/ML
1 SPRAY, METERED NASAL
COMMUNITY

## 2024-09-04 RX ORDER — BLOOD-GLUCOSE METER
EACH MISCELLANEOUS
COMMUNITY
Start: 2024-06-12 | End: 2024-09-04

## 2024-09-04 RX ORDER — FLUTICASONE FUROATE, UMECLIDINIUM BROMIDE AND VILANTEROL TRIFENATATE 100; 62.5; 25 UG/1; UG/1; UG/1
1 POWDER RESPIRATORY (INHALATION) DAILY
COMMUNITY
Start: 2024-06-03 | End: 2024-09-04

## 2024-09-04 RX ORDER — PREDNISOLONE ACETATE 10 MG/ML
1 SUSPENSION/ DROPS OPHTHALMIC
COMMUNITY

## 2024-09-04 RX ORDER — ALBUTEROL SULFATE 90 UG/1
1 AEROSOL, METERED RESPIRATORY (INHALATION) EVERY 4 HOURS PRN
COMMUNITY

## 2024-09-04 RX ORDER — CYCLOSPORINE 0.5 MG/ML
1 EMULSION OPHTHALMIC EVERY 12 HOURS
COMMUNITY
Start: 2024-09-03

## 2024-09-04 RX ORDER — CELECOXIB 200 MG/1
200 CAPSULE ORAL DAILY
COMMUNITY

## 2024-09-04 RX ORDER — SEMAGLUTIDE 1.34 MG/ML
1 INJECTION, SOLUTION SUBCUTANEOUS WEEKLY
COMMUNITY

## 2024-09-04 RX ORDER — FLUTICASONE FUROATE, UMECLIDINIUM BROMIDE AND VILANTEROL TRIFENATATE 100; 62.5; 25 UG/1; UG/1; UG/1
1 POWDER RESPIRATORY (INHALATION) DAILY
COMMUNITY
Start: 2024-08-26

## 2024-09-04 RX ORDER — FUROSEMIDE 20 MG
20 TABLET ORAL DAILY
COMMUNITY
End: 2024-09-04

## 2024-09-04 RX ORDER — PRENATAL VIT NO.126/IRON/FOLIC 28MG-0.8MG
TABLET ORAL DAILY
COMMUNITY
End: 2024-09-04

## 2024-09-04 RX ORDER — LORATADINE 10 MG/1
10 TABLET ORAL DAILY
COMMUNITY
End: 2024-09-04

## 2024-09-04 RX ORDER — DESVENLAFAXINE 50 MG/1
50 TABLET, FILM COATED, EXTENDED RELEASE ORAL DAILY
Qty: 90 TABLET | Refills: 0 | Status: SHIPPED | OUTPATIENT
Start: 2024-09-04 | End: 2024-09-16 | Stop reason: SDUPTHER

## 2024-09-04 RX ORDER — LANOLIN ALCOHOL/MO/W.PET/CERES
1000 CREAM (GRAM) TOPICAL DAILY
COMMUNITY

## 2024-09-04 RX ORDER — FERROUS SULFATE 325(65) MG
1 TABLET ORAL
COMMUNITY
Start: 2024-08-14

## 2024-09-04 RX ORDER — EPINEPHRINE 0.3 MG/.3ML
0.3 INJECTION SUBCUTANEOUS
COMMUNITY

## 2024-09-04 NOTE — PROGRESS NOTES
"Chief Complaint  Establish Care, Arthritis, Hypertension, Hyperlipidemia, and Cyst (Pt had a CT scan and a cyst was found on her kidney, she would like a referral.)    Subjective        Selma SMART presents to [facility]  History of Present Illness  48-year-old female with DiGeorge syndrome, hypothyroidism, cluster headaches, CAD who presents to establish care today.    Her acute complaints today are Raynaud's syndrome.  She has a lot of discomfort in her hands especially when cold.  She also notices discoloration in her toes associated with pain.  This is intermittent in nature.  Decreased hair growth on lower extremities.    Has been having a lot of vasomotor symptoms related to menopause.  She has an IUD in place and so is unsure if she is going through menopause or not.  Is nearing the time when she needs IUD removed.  She requests GYN referral.    She has neck pain.  Never been imaged.  Not sure if this is related to osteoarthritis or more muscular in nature.    Recent ER visit last month for abdominal pain and diarrhea.  She had an abdominal CT which showed no evidence of pancreatitis.  She did have 2 subcentimeter low-attenuation right kidney cysts.  She also had a few small left cysts.  She had 3 nonobstructing stones of the left kidney.  Labs with mild hypercalcemia, hypokalemia, lipase 99.  Urinalysis with trace leukocyte esterase. Recommended to follow-up left renal cysts with 12-month ultrasound.    Follows with endocrinology at [place] for hypothyroidism.  On levothyroxine for this.    Follows with cardiology for CAD.  On statin.    Follows with ENT/audiology/infectious disease for DiGeorge syndrome.    History of cluster headaches for which she is taking Topamax.            Objective   Vital Signs:  /78   Pulse 72   Temp 97.6 °F (36.4 °C)   Ht 160 cm (63\")   Wt 63.5 kg (140 lb)   SpO2 100%   BMI 24.80 kg/m²   Estimated body mass index is 24.8 kg/m² as " "calculated from the following:    Height as of this encounter: 160 cm (63\").    Weight as of this encounter: 63.5 kg (140 lb).    BMI is within normal parameters. No other follow-up for BMI required.      Physical Exam  Constitutional:       General: She is not in acute distress.  Eyes:      Conjunctiva/sclera: Conjunctivae normal.   Cardiovascular:      Rate and Rhythm: Normal rate and regular rhythm.   Pulmonary:      Effort: Pulmonary effort is normal. No respiratory distress.      Breath sounds: Normal breath sounds.   Abdominal:      Palpations: Abdomen is soft.      Tenderness: There is no abdominal tenderness.   Skin:     General: Skin is warm and dry.   Neurological:      Mental Status: She is alert and oriented to person, place, and time.   Psychiatric:         Mood and Affect: Mood normal.         Behavior: Behavior normal.        Result Review :  The following data was reviewed by: Orin Lundberg MD on 09/04/2024:  Common labs          3/27/2024    11:17 5/28/2024    14:24 8/5/2024    08:40   Common Labs   Glucose  96  91    BUN  13  13    Creatinine  1.19  1.03    Sodium  138  139    Potassium  3.4  3.2    Chloride  104  105    Calcium  10.4  10.7    Albumin  4.5  4.8    Total Bilirubin  0.4  0.6    Alkaline Phosphatase  70  72    AST (SGOT)  23  21    ALT (SGPT)  14  12    WBC 7.08     8.41  6.67    Hemoglobin 15.2     13.1  14.5    Hematocrit 46.4     38.9  43.2    Platelets 263     263  255    Hemoglobin A1C 5.6            Details          This result is from an external source.             Data reviewed : See HPI above           Assessment and Plan   Diagnoses and all orders for this visit:    1. Cervical spondylosis (Primary)  -     XR Spine Cervical 2 or 3 View; Future    2. IUD contraception  -     Ambulatory Referral to Gynecology    3. Perimenopausal vasomotor symptoms  -     desvenlafaxine (Pristiq) 50 MG 24 hr tablet; Take 1 tablet by mouth Daily.  Dispense: 90 tablet; Refill: 0    4. " Raynaud's disease without gangrene  -     Ambulatory Referral to Rheumatology    5. Discoloration of skin of toe  -     Doppler Ankle Brachial Index Single Level CAR; Future    6. Other specified symptoms and signs involving the circulatory and respiratory systems  -     Doppler Ankle Brachial Index Single Level CAR; Future    7. Acquired hypothyroidism    8. Essential hypertension    9. Seasonal allergies    Raynaud's disease -noticeable in hands.  Not currently on calcium channel blocker or other medication.  She also complains of symptoms in her toes/feet.  She she has decreased hair growth and CAD which does concern me for vascular disease.  I would recommend we proceed with ABIs for lower extremities.  Avoid cold.  Can consider rheumatology referral pending workup.  Chronic neck pain -differential diagnosis includes osteoarthritis or muscular.  No neurologic deficits. continue Celebrex.  Obtain x-rays for further evaluation.  Perimenopausal vasomotor symptoms -significant.  Has IUD in place so unsure if she has gone through menopause or not.  History of DVT so would avoid HRT.  Start Pristiq.  Refer to gynecology for management of IUD.  Hypothyroidism is controlled.  Follows with endocrinology for this.  Hypertension is well-controlled.  Continue current regimen.  Allergic rhinitis is well-controlled with allergy injections and current medications.  Continue.  CAD stable with aspirin and statin.  No concerns for bleeding.  DiGeorge syndrome -follows with ENT/audiology.  No current infections.         Follow Up   Return in about 3 months (around 12/4/2024) for Annual physical.  Patient was given instructions and counseling regarding her condition or for health maintenance advice. Please see specific information pulled into the AVS if appropriate.

## 2024-09-06 ENCOUNTER — TELEPHONE (OUTPATIENT)
Dept: FAMILY MEDICINE CLINIC | Facility: CLINIC | Age: 49
End: 2024-09-06
Payer: MEDICARE

## 2024-09-06 NOTE — TELEPHONE ENCOUNTER
Patient notified that PA's take time and I will let her MA know and as soon as we get the xray result we will contact her

## 2024-09-06 NOTE — TELEPHONE ENCOUNTER
Patient has several questions regarding prescribed medications. She stated she knows that a PA is needed for the Restasis. And she would like to know if the neck x-ray has been resulted.    Please advise patient.

## 2024-09-09 PROBLEM — I25.10 CAD (CORONARY ARTERY DISEASE): Status: ACTIVE | Noted: 2024-09-09

## 2024-09-10 RX ORDER — PANTOPRAZOLE SODIUM 40 MG/1
40 TABLET, DELAYED RELEASE ORAL DAILY
Qty: 90 TABLET | Refills: 0 | Status: SHIPPED | OUTPATIENT
Start: 2024-09-10

## 2024-09-11 ENCOUNTER — TELEPHONE (OUTPATIENT)
Dept: FAMILY MEDICINE CLINIC | Facility: CLINIC | Age: 49
End: 2024-09-11
Payer: MEDICARE

## 2024-09-11 DIAGNOSIS — E03.9 ACQUIRED HYPOTHYROIDISM: Primary | ICD-10-CM

## 2024-09-11 NOTE — TELEPHONE ENCOUNTER
Patient was unaware that  practices in [place]. Patients preference is a male provider within [facility].

## 2024-09-11 NOTE — TELEPHONE ENCOUNTER
Patient requesting an endocrinology referral to see Dr. Carlos Enrique Vizcaino ([facility]). Clinical, please advise.

## 2024-09-11 NOTE — TELEPHONE ENCOUNTER
Referral has been placed to [facility] endocrinology group and they will call her about scheduling.

## 2024-09-11 NOTE — TELEPHONE ENCOUNTER
This is an endocrinologist that is at [facility].  Please confirm with her that she is okay with seeing an endocrinologist in [place]?  Thank you!

## 2024-09-16 DIAGNOSIS — N95.1 PERIMENOPAUSAL VASOMOTOR SYMPTOMS: ICD-10-CM

## 2024-09-16 DIAGNOSIS — F41.9 ANXIETY: Primary | ICD-10-CM

## 2024-09-16 RX ORDER — DESVENLAFAXINE 50 MG/1
50 TABLET, FILM COATED, EXTENDED RELEASE ORAL DAILY
Qty: 90 TABLET | Refills: 0 | Status: SHIPPED | OUTPATIENT
Start: 2024-09-16 | End: 2024-09-20

## 2024-09-17 ENCOUNTER — HOSPITAL ENCOUNTER (OUTPATIENT)
Dept: CARDIOLOGY | Facility: HOSPITAL | Age: 49
Discharge: HOME | End: 2024-09-17
Admitting: STUDENT IN AN ORGANIZED HEALTH CARE EDUCATION/TRAINING PROGRAM
Payer: MEDICARE

## 2024-09-17 DIAGNOSIS — R09.89 OTHER SPECIFIED SYMPTOMS AND SIGNS INVOLVING THE CIRCULATORY AND RESPIRATORY SYSTEMS: ICD-10-CM

## 2024-09-17 DIAGNOSIS — L81.9 DISCOLORATION OF SKIN OF TOE: ICD-10-CM

## 2024-09-17 LAB
BH CV LOWER ARTERIAL LEFT ABI RATIO: 1.06
BH CV LOWER ARTERIAL LEFT DORSALIS PEDIS SYS MAX: 122
BH CV LOWER ARTERIAL LEFT GREAT TOE SYS MAX: 109
BH CV LOWER ARTERIAL LEFT POST TIBIAL SYS MAX: 131
BH CV LOWER ARTERIAL LEFT TBI RATIO: 0.88
BH CV LOWER ARTERIAL RIGHT ABI RATIO: 1.07
BH CV LOWER ARTERIAL RIGHT DORSALIS PEDIS SYS MAX: 127
BH CV LOWER ARTERIAL RIGHT GREAT TOE SYS MAX: 114
BH CV LOWER ARTERIAL RIGHT POST TIBIAL SYS MAX: 133
BH CV LOWER ARTERIAL RIGHT TBI RATIO: 0.92
UPPER ARTERIAL LEFT ARM BRACHIAL SYS MAX: 124
UPPER ARTERIAL RIGHT ARM BRACHIAL SYS MAX: 121

## 2024-09-17 PROCEDURE — 93922 UPR/L XTREMITY ART 2 LEVELS: CPT | Performed by: SURGERY

## 2024-09-17 PROCEDURE — 93922 UPR/L XTREMITY ART 2 LEVELS: CPT

## 2024-09-19 ENCOUNTER — TELEPHONE (OUTPATIENT)
Dept: FAMILY MEDICINE CLINIC | Facility: CLINIC | Age: 49
End: 2024-09-19

## 2024-09-19 NOTE — TELEPHONE ENCOUNTER
Caller: Selma Holden    Relationship: Self    Best call back number: [phone]     What is the medical concern/diagnosis: REDNESS, INFLAMMATION, REYNARDS     What specialty or service is being requested: RHEUMATOLOGY     What is the provider, practice or medical service name: [facility]    What is the office location: [address] [place] [address]    What is the office phone number: [phone]    Any additional details: PATIENT STATES THAT SHE SAW DR SCHMITZ 2 WEEKS AGO AND DISCUSSED HER BEING REFERRED TO [facility].     PATIENT HAS NOT HEARD ANYTHING BACK AND WOULD LIKE TO FOLLOW UP ON THIS TO GET THE REFERRAL SENT TO THEM.     PLEASE CONTACT PATIENT TO DISCUSS AND ADVISE.

## 2024-09-19 NOTE — TELEPHONE ENCOUNTER
Caller: Selma Holden    Relationship: Self    Best call back number: [phone]     What is the best time to reach you: ANY TIME    What was the call regarding: PATIENT STATES THAT SHE IS HAVING ISSUES GETTING A PRIOR AUTHORIZATION FOR desvenlafaxine (Pristiq) 50 MG 24 hr tablet.     PATIENT STATES THAT [facility] TOLD HER THEY HAVE NOT HEARD BACK ON THE PRIOR AUTHORIZATION.     INSURANCE STATES THAT THEY HAD OTHER MEDICATIONS THAT WOULD BE THE SAME THING THAT WOULD BE COVERED.     PLEASE CONTACT PATIENT TO DISCUSS AND ADVISE.     Is it okay if the provider responds through MyChart: YES

## 2024-09-20 ENCOUNTER — TELEPHONE (OUTPATIENT)
Dept: FAMILY MEDICINE CLINIC | Facility: CLINIC | Age: 49
End: 2024-09-20
Payer: MEDICARE

## 2024-09-20 NOTE — TELEPHONE ENCOUNTER
Referral was placed on 9/4 and looks like it is authorized and waiting to be scheduled.  Please have her reach out to the office to schedule this appointment.

## 2024-09-30 RX ORDER — TOPIRAMATE 25 MG/1
25 TABLET, FILM COATED ORAL 2 TIMES DAILY
Qty: 60 TABLET | Refills: 1 | Status: SHIPPED | OUTPATIENT
Start: 2024-09-30

## 2024-10-01 ENCOUNTER — TELEPHONE (OUTPATIENT)
Dept: FAMILY MEDICINE CLINIC | Facility: CLINIC | Age: 49
End: 2024-10-01

## 2024-10-01 RX ORDER — METOPROLOL SUCCINATE 25 MG/1
25 TABLET, EXTENDED RELEASE ORAL DAILY
Qty: 90 TABLET | Refills: 3 | Status: SHIPPED | OUTPATIENT
Start: 2024-10-01

## 2024-10-01 NOTE — TELEPHONE ENCOUNTER
Caller: Selma Holden    Relationship: Self    Best call back number:     [phone]       Which medication are you concerned about: metoprolol     Who prescribed you this medication: DR SCHMITZ    What are your concerns:     PATIENT IS NEEDING A REFILL OF HER MEDICATION BUT THE ONE ON THE LIST IS NOT WHAT SHE STATES SHE IS CURRENTLY TAKING.    SHE STATED SHE IS TAKING:    METOPROLOL 25 ER SECTIION 8   1 TIME A DAY       SHE NEEDS THIS SENT TO:    [id number] [address] AT [facility] [phone] [facility] [phone]

## 2024-10-14 ENCOUNTER — OFFICE VISIT (OUTPATIENT)
Dept: FAMILY MEDICINE CLINIC | Facility: CLINIC | Age: 49
End: 2024-10-14
Payer: MEDICARE

## 2024-10-14 VITALS
HEART RATE: 78 BPM | TEMPERATURE: 97.3 F | BODY MASS INDEX: 25.16 KG/M2 | OXYGEN SATURATION: 98 % | HEIGHT: 63 IN | WEIGHT: 142 LBS | DIASTOLIC BLOOD PRESSURE: 80 MMHG | SYSTOLIC BLOOD PRESSURE: 134 MMHG

## 2024-10-14 DIAGNOSIS — R19.7 DIARRHEA, UNSPECIFIED TYPE: Primary | ICD-10-CM

## 2024-10-14 DIAGNOSIS — F41.1 GAD (GENERALIZED ANXIETY DISORDER): ICD-10-CM

## 2024-10-14 PROCEDURE — 99214 OFFICE O/P EST MOD 30 MIN: CPT | Performed by: STUDENT IN AN ORGANIZED HEALTH CARE EDUCATION/TRAINING PROGRAM

## 2024-10-14 PROCEDURE — 1159F MED LIST DOCD IN RCRD: CPT | Performed by: STUDENT IN AN ORGANIZED HEALTH CARE EDUCATION/TRAINING PROGRAM

## 2024-10-14 PROCEDURE — 3075F SYST BP GE 130 - 139MM HG: CPT | Performed by: STUDENT IN AN ORGANIZED HEALTH CARE EDUCATION/TRAINING PROGRAM

## 2024-10-14 PROCEDURE — 3079F DIAST BP 80-89 MM HG: CPT | Performed by: STUDENT IN AN ORGANIZED HEALTH CARE EDUCATION/TRAINING PROGRAM

## 2024-10-14 PROCEDURE — 1160F RVW MEDS BY RX/DR IN RCRD: CPT | Performed by: STUDENT IN AN ORGANIZED HEALTH CARE EDUCATION/TRAINING PROGRAM

## 2024-10-14 RX ORDER — METOPROLOL TARTRATE 25 MG/1
25 TABLET, FILM COATED ORAL
COMMUNITY
Start: 2024-09-03 | End: 2024-10-18

## 2024-10-14 RX ORDER — POTASSIUM CHLORIDE 1500 MG/1
20 TABLET, EXTENDED RELEASE ORAL
COMMUNITY
Start: 2024-08-18

## 2024-10-14 RX ORDER — LINACLOTIDE 72 UG/1
1 CAPSULE, GELATIN COATED ORAL DAILY
COMMUNITY
Start: 2024-09-08

## 2024-10-14 RX ORDER — CHOLESTYRAMINE 4 G/9G
1 POWDER, FOR SUSPENSION ORAL
Qty: 90 PACKET | Refills: 1 | Status: CANCELLED | OUTPATIENT
Start: 2024-10-14

## 2024-10-14 RX ORDER — BLOOD SUGAR DIAGNOSTIC
1 STRIP MISCELLANEOUS AS NEEDED
COMMUNITY
Start: 2024-09-30

## 2024-10-14 RX ORDER — OFLOXACIN 3 MG/ML
5 SOLUTION AURICULAR (OTIC)
COMMUNITY
Start: 2024-10-02

## 2024-10-14 NOTE — PROGRESS NOTES
Chief Complaint  Cervical spondylosis, Follow-up, Shortness of Breath, Neck Pain (Pt complains of trouble breathing when yawning and swallowing, this has been for about a month.), and Diarrhea (Pt complains of symptom lasting a month.)    Subjective        Selma Holden presents to [facility]  History of Present Illness  49-year-old female with DiGeorge syndrome, hypothyroidism, cluster headaches, CAD who presents for follow-up today.    She has neck pain. XR cervical spine obtained at last visit shows facet hypertrophy. Disc space narrowing at C5-C6, C6-C7 and C7-T1. Spurring and endplate hypertrophy at C6-C7.     Diarrhea -follows with GI.  ER visit within the last year for abdominal pain and diarrhea.  She had an abdominal CT which showed no evidence of pancreatitis.  She did have 2 subcentimeter low-attenuation right kidney cysts.  She also had a few small left cysts.  She had 3 nonobstructing stones of the left kidney.  Labs with mild hypercalcemia, hypokalemia, lipase 99.  Urinalysis with trace leukocyte esterase. Recommended to follow-up left renal cysts with 12-month ultrasound.    Mostly notices attacks of shortness of breath when she lays down at night.  Recently had echocardiogram which showed normal LVEF.  No significant valvular abnormalities.  No diastolic dysfunction. Her pulmonologist increase Trelegy to 200 mcg and advised to take albuterol as needed.  Recommended to continue Singulair, Claritin.  Also recommended sleep study. This has improved symptoms.    Anxiety/depression/vasomotor sx's improved with pristiq.      Chronic conditions include:  Follows with endocrinology at [facility] for hypothyroidism.  On levothyroxine for this.  Follows with cardiology for CAD.  On statin.  Follows with ENT/audiology/infectious disease for DiGeorge syndrome.  History of cluster headaches for which she is taking Topamax.            Objective   Vital Signs:  /80   Pulse 78    "Temp 97.3 °F (36.3 °C)   Ht 160 cm (63\")   Wt 64.4 kg (142 lb)   SpO2 98%   BMI 25.15 kg/m²   Estimated body mass index is 25.15 kg/m² as calculated from the following:    Height as of this encounter: 160 cm (63\").    Weight as of this encounter: 64.4 kg (142 lb).            Physical Exam  Constitutional:       General: She is not in acute distress.  Eyes:      Conjunctiva/sclera: Conjunctivae normal.   Cardiovascular:      Rate and Rhythm: Normal rate and regular rhythm.   Pulmonary:      Effort: Pulmonary effort is normal. No respiratory distress.      Breath sounds: Normal breath sounds.   Abdominal:      Palpations: Abdomen is soft.      Tenderness: There is no abdominal tenderness.   Skin:     General: Skin is warm and dry.   Neurological:      Mental Status: She is alert and oriented to person, place, and time.   Psychiatric:         Mood and Affect: Mood normal.         Behavior: Behavior normal.        Result Review :  The following data was reviewed by: Orin Lundberg MD on 10/14/2024:  Common labs          3/27/2024    11:17 5/28/2024    14:24 8/5/2024    08:40   Common Labs   Glucose  96  91    BUN  13  13    Creatinine  1.19  1.03    Sodium  138  139    Potassium  3.4  3.2    Chloride  104  105    Calcium  10.4  10.7    Albumin  4.5  4.8    Total Bilirubin  0.4  0.6    Alkaline Phosphatase  70  72    AST (SGOT)  23  21    ALT (SGPT)  14  12    WBC 7.08     8.41  6.67    Hemoglobin 15.2     13.1  14.5    Hematocrit 46.4     38.9  43.2    Platelets 263     263  255    Hemoglobin A1C 5.6            Details          This result is from an external source.             Data reviewed : none           Assessment and Plan   Diagnoses and all orders for this visit:    1. Diarrhea, unspecified type (Primary)    Other orders  -     cholestyramine (Questran) 4 g packet; Take 1 packet by mouth 3 (Three) Times a Day With Meals.  Dispense: 90 packet; Refill: 0             Follow Up   Return in about 7 weeks (around " 12/2/2024) for Medicare Wellness.  Patient was given instructions and counseling regarding her condition or for health maintenance advice. Please see specific information pulled into the AVS if appropriate.

## 2024-10-15 RX ORDER — CHOLESTYRAMINE 4 G/9G
1 POWDER, FOR SUSPENSION ORAL
Qty: 90 PACKET | Refills: 0 | Status: SHIPPED | OUTPATIENT
Start: 2024-10-15

## 2024-10-17 PROBLEM — R19.7 DIARRHEA: Status: ACTIVE | Noted: 2024-10-17

## 2024-10-17 NOTE — ASSESSMENT & PLAN NOTE
Worse since [facility].  Complicated by chronic constipation - is on linzess with regular bowel movements.  CT A/P normal in past. Due for cscope - I would recommend this. Has GI follow up to schedule.   Trial cholestyramine. Monitor TSH closely with initiation.

## 2024-10-18 ENCOUNTER — OFFICE VISIT (OUTPATIENT)
Dept: CARDIOLOGY | Facility: CLINIC | Age: 49
End: 2024-10-18
Payer: MEDICARE

## 2024-10-18 VITALS
BODY MASS INDEX: 25.69 KG/M2 | HEIGHT: 63 IN | HEART RATE: 62 BPM | OXYGEN SATURATION: 100 % | SYSTOLIC BLOOD PRESSURE: 136 MMHG | DIASTOLIC BLOOD PRESSURE: 80 MMHG | WEIGHT: 145 LBS

## 2024-10-18 DIAGNOSIS — I10 ESSENTIAL HYPERTENSION: Primary | ICD-10-CM

## 2024-10-18 DIAGNOSIS — I25.10 CORONARY ARTERY DISEASE INVOLVING NATIVE CORONARY ARTERY OF NATIVE HEART WITHOUT ANGINA PECTORIS: ICD-10-CM

## 2024-10-18 RX ORDER — DILTIAZEM HYDROCHLORIDE 240 MG/1
240 CAPSULE, COATED, EXTENDED RELEASE ORAL DAILY
Qty: 30 CAPSULE | Refills: 11 | Status: SHIPPED | OUTPATIENT
Start: 2024-10-18

## 2024-10-18 RX ORDER — METOPROLOL SUCCINATE 25 MG/1
12.5 TABLET, EXTENDED RELEASE ORAL DAILY
Start: 2024-10-18

## 2024-10-18 NOTE — PROGRESS NOTES
"      CARDIOLOGY    Roger Mendez MD    ENCOUNTER DATE:  10/18/2024    Selma Holden / 49 y.o. / female        CHIEF COMPLAINT / REASON FOR OFFICE VISIT     Hypertension and Palpitations      HISTORY OF PRESENT ILLNESS       Hypertension  Associated symptoms include palpitations.   Palpitations       Selma Holden is a 49 y.o. female who presents today for consultation.  Patient has DiGeorge syndrome.  Patient was found to have this actually just a few years ago.  Her son had had some issues he was tested and was found to have this syndrome.  She was tested and found that she had it.  It is very interesting because in retrospect she does have some signs and symptoms of this.  She had undergone a CT scan which showed a right sided aortic arch.  There was no evidence of constriction per the report.  Patient has not had a cleft palate or soft palate abnormalities.  Patient did have some coronary artery calcifications noted on her chest CT in April of this year.  She had been followed by [facility] cardiologist and wished to change.  She had many questions and I attempted to answer all of them.  Patient was slightly hard of hearing but clearly could understand.      The following portions of the patient's history were reviewed and updated as appropriate: allergies, current medications, past family history, past medical history, past social history, past surgical history and problem list.      VITAL SIGNS     Visit Vitals  /80 (BP Location: Left arm)   Pulse 62   Ht 160 cm (63\")   Wt 65.8 kg (145 lb)   SpO2 100%   BMI 25.69 kg/m²         Wt Readings from Last 3 Encounters:   10/18/24 65.8 kg (145 lb)   10/14/24 64.4 kg (142 lb)   09/04/24 63.5 kg (140 lb)     Body mass index is 25.69 kg/m².      REVIEW OF SYSTEMS   Review of Systems   Cardiovascular:  Positive for palpitations.           PHYSICAL EXAMINATION     Vitals reviewed.   Constitutional:       Appearance: Healthy appearance.   Pulmonary:      " Effort: Pulmonary effort is normal.   Cardiovascular:      Normal rate. Regular rhythm. Normal S1. Normal S2.       Murmurs: There is no murmur.      No gallop.  No click. No rub.   Pulses:     Intact distal pulses.   Edema:     Peripheral edema absent.   Neurological:      Mental Status: Alert.           REVIEWED DATA     Procedures    Cardiac Procedures:            ASSESSMENT & PLAN      Diagnosis Plan   1. Essential hypertension        2. Coronary artery disease involving native coronary artery of native heart without angina pectoris              SUMMARY/DISCUSSION  Hypertension I am going to change her medical regimen around.  See issues below for justification.  DiGeorge syndrome patient does have her noticed phenomenon as well as a right sided arch.  Patient has mild tricuspid regurgitation.  She asked what could be done to fix that and there is really nothing to fix.  I spent quite a bit of time trying to explain to her that it is not clinically significant.  Patient has Raynaud's phenomenon.  Her blood pressures been elevated today it was a little bit better but she says it has been higher than this.  Patient does describe her feet turning white her hands turning red as soon as the temperature drops.  She describes a classically.  Because of that I am actually going to change her from her beta-blocker to a calcium channel blocker.  Reportedly in her chart she had a rash with amlodipine so I am going to utilize diltiazem.  I may ultimately wean her off her beta-blocker altogether we will see how she does.  She was taken beta-blockers for palpitations in the past.  Coronary artery calcification.  Patient occasionally has chest discomfort.  It does not occur when she is exercising.  She says it mostly occurs when she is lying down.  She is on a statin her LDL is 60.  No further workup is necessary at this time.  I did review signs and symptoms to look for should anything change.  Follow-up 6 to 12 weeks we will  reassess from there.        MEDICATIONS         Discharge Medications            Accurate as of October 18, 2024  3:01 PM. If you have any questions, ask your nurse or doctor.                New Medications        Instructions Start Date   dilTIAZem  MG 24 hr capsule  Commonly known as: CARDIZEM CD  Started by: Roger Mendez   240 mg, Oral, Daily             Changes to Medications        Instructions Start Date   metoprolol succinate XL 25 MG 24 hr tablet  Commonly known as: TOPROL-XL  What changed: how much to take  Changed by: Roger Mcclainaden   12.5 mg, Oral, Daily             Continue These Medications        Instructions Start Date   albuterol sulfate  (90 Base) MCG/ACT inhaler  Commonly known as: PROVENTIL HFA;VENTOLIN HFA;PROAIR HFA   1 puff, Every 4 Hours PRN      ALIGN PO   Take  by mouth.      aspirin 81 MG EC tablet   81 mg, Oral, Daily      azelastine 0.1 % nasal spray  Commonly known as: ASTELIN   1 spray      celecoxib 200 MG capsule  Commonly known as: CeleBREX   200 mg, Daily      cholestyramine 4 g packet  Commonly known as: Questran   1 packet, Oral, 3 Times Daily With Meals      desvenlafaxine 50 MG 24 hr tablet  Commonly known as: Pristiq   50 mg, Oral, Daily      EpiPen 2-Isiah 0.3 MG/0.3ML solution auto-injector injection  Generic drug: EPINEPHrine   0.3 mg      famotidine 20 MG tablet  Commonly known as: PEPCID   20 mg, 2 Times Daily      FeroSul 325 (65 Fe) MG tablet  Generic drug: ferrous sulfate   1 tablet, Daily With Breakfast      hydroCHLOROthiazide 25 MG tablet   25 mg, Daily      levothyroxine 75 MCG tablet  Commonly known as: SYNTHROID, LEVOTHROID   75 mcg, Daily      Linzess 72 MCG capsule capsule  Generic drug: linaclotide   1 capsule, Daily      losartan 25 MG tablet  Commonly known as: COZAAR   12.5 mg, Daily      montelukast 10 MG tablet  Commonly known as: SINGULAIR   10 mg, Nightly      multivitamin with minerals tablet tablet   1 tablet, Daily      nabumetone 750  MG tablet  Commonly known as: RELAFEN   Daily      ofloxacin 0.3 % otic solution  Commonly known as: FLOXIN   5 drops      OneTouch Ultra test strip  Generic drug: glucose blood   1 each, As Needed      pantoprazole 40 MG EC tablet  Commonly known as: PROTONIX   40 mg, Oral, Daily      potassium chloride 20 MEQ CR tablet  Commonly known as: KLOR-CON M20   20 mEq      potassium chloride ER 20 MEQ tablet controlled-release ER tablet  Commonly known as: K-TAB   20 mEq, 2 Times Daily      prednisoLONE acetate 1 % ophthalmic suspension  Commonly known as: PRED FORTE   1 drop      Restasis 0.05 % ophthalmic emulsion  Generic drug: cycloSPORINE   1 drop, Every 12 Hours      rosuvastatin 10 MG tablet  Commonly known as: CRESTOR   TAKE ONE TABLET BY MOUTH DAILY      topiramate 25 MG tablet  Commonly known as: TOPAMAX   25 mg, Oral, 2 Times Daily      Trelegy Ellipta 100-62.5-25 MCG/ACT inhaler  Generic drug: Fluticasone-Umeclidin-Vilant   1 puff, Daily      vitamin B-12 1000 MCG tablet  Commonly known as: CYANOCOBALAMIN   1,000 mcg, Daily             Stop These Medications      metoprolol tartrate 25 MG tablet  Commonly known as: LOPRESSOR  Stopped by: Roger Mendez                  **Dragon Disclaimer:   Much of this encounter note is an electronic transcription/translation of spoken language to printed text. The electronic translation of spoken language may permit erroneous, or at times, nonsensical words or phrases to be inadvertently transcribed. Although I have reviewed the note for such errors, some may still exist.

## 2024-10-22 ENCOUNTER — APPOINTMENT (OUTPATIENT)
Dept: CARDIOLOGY | Facility: HOSPITAL | Age: 49
End: 2024-10-22
Payer: MEDICARE

## 2024-10-22 ENCOUNTER — HOSPITAL ENCOUNTER (EMERGENCY)
Facility: HOSPITAL | Age: 49
Discharge: HOME | End: 2024-10-22
Attending: EMERGENCY MEDICINE | Admitting: EMERGENCY MEDICINE
Payer: MEDICARE

## 2024-10-22 ENCOUNTER — NURSE TRIAGE (OUTPATIENT)
Dept: CALL CENTER | Facility: HOSPITAL | Age: 49
End: 2024-10-22
Payer: MEDICARE

## 2024-10-22 VITALS
RESPIRATION RATE: 20 BRPM | OXYGEN SATURATION: 98 % | SYSTOLIC BLOOD PRESSURE: 121 MMHG | DIASTOLIC BLOOD PRESSURE: 81 MMHG | TEMPERATURE: 99 F | HEART RATE: 120 BPM

## 2024-10-22 DIAGNOSIS — M79.669 PAIN IN SHIN, UNSPECIFIED LATERALITY: ICD-10-CM

## 2024-10-22 DIAGNOSIS — R19.7 DIARRHEA, UNSPECIFIED TYPE: Primary | ICD-10-CM

## 2024-10-22 DIAGNOSIS — J02.9 SORE THROAT: ICD-10-CM

## 2024-10-22 LAB
ALBUMIN SERPL-MCNC: 4 G/DL (ref 3.5–5.2)
ALBUMIN/GLOB SERPL: 1.4 G/DL
ALP SERPL-CCNC: 80 U/L (ref 39–117)
ALT SERPL W P-5'-P-CCNC: 21 U/L (ref 1–33)
ANION GAP SERPL CALCULATED.3IONS-SCNC: 9.3 MMOL/L (ref 5–15)
AST SERPL-CCNC: 24 U/L (ref 1–32)
BASOPHILS # BLD AUTO: 0.08 10*3/MM3 (ref 0–0.2)
BASOPHILS NFR BLD AUTO: 1.3 % (ref 0–1.5)
BH CV LOWER VASCULAR LEFT COMMON FEMORAL AUGMENT: NORMAL
BH CV LOWER VASCULAR LEFT COMMON FEMORAL COMPETENT: NORMAL
BH CV LOWER VASCULAR LEFT COMMON FEMORAL COMPRESS: NORMAL
BH CV LOWER VASCULAR LEFT COMMON FEMORAL PHASIC: NORMAL
BH CV LOWER VASCULAR LEFT COMMON FEMORAL SPONT: NORMAL
BH CV LOWER VASCULAR LEFT DISTAL FEMORAL COMPRESS: NORMAL
BH CV LOWER VASCULAR LEFT GASTRONEMIUS COMPRESS: NORMAL
BH CV LOWER VASCULAR LEFT GREATER SAPH AK COMPRESS: NORMAL
BH CV LOWER VASCULAR LEFT GREATER SAPH BK COMPRESS: NORMAL
BH CV LOWER VASCULAR LEFT LESSER SAPH COMPRESS: NORMAL
BH CV LOWER VASCULAR LEFT MID FEMORAL AUGMENT: NORMAL
BH CV LOWER VASCULAR LEFT MID FEMORAL COMPETENT: NORMAL
BH CV LOWER VASCULAR LEFT MID FEMORAL COMPRESS: NORMAL
BH CV LOWER VASCULAR LEFT MID FEMORAL PHASIC: NORMAL
BH CV LOWER VASCULAR LEFT MID FEMORAL SPONT: NORMAL
BH CV LOWER VASCULAR LEFT PERONEAL COMPRESS: NORMAL
BH CV LOWER VASCULAR LEFT POPLITEAL AUGMENT: NORMAL
BH CV LOWER VASCULAR LEFT POPLITEAL COMPETENT: NORMAL
BH CV LOWER VASCULAR LEFT POPLITEAL COMPRESS: NORMAL
BH CV LOWER VASCULAR LEFT POPLITEAL PHASIC: NORMAL
BH CV LOWER VASCULAR LEFT POPLITEAL SPONT: NORMAL
BH CV LOWER VASCULAR LEFT POSTERIOR TIBIAL COMPRESS: NORMAL
BH CV LOWER VASCULAR LEFT PROFUNDA FEMORAL COMPRESS: NORMAL
BH CV LOWER VASCULAR LEFT PROXIMAL FEMORAL COMPRESS: NORMAL
BH CV LOWER VASCULAR LEFT SAPHENOFEMORAL JUNCTION COMPRESS: NORMAL
BH CV LOWER VASCULAR RIGHT COMMON FEMORAL AUGMENT: NORMAL
BH CV LOWER VASCULAR RIGHT COMMON FEMORAL COMPETENT: NORMAL
BH CV LOWER VASCULAR RIGHT COMMON FEMORAL COMPRESS: NORMAL
BH CV LOWER VASCULAR RIGHT COMMON FEMORAL PHASIC: NORMAL
BH CV LOWER VASCULAR RIGHT COMMON FEMORAL SPONT: NORMAL
BH CV LOWER VASCULAR RIGHT DISTAL FEMORAL COMPRESS: NORMAL
BH CV LOWER VASCULAR RIGHT GASTRONEMIUS COMPRESS: NORMAL
BH CV LOWER VASCULAR RIGHT GREATER SAPH AK COMPRESS: NORMAL
BH CV LOWER VASCULAR RIGHT GREATER SAPH BK COMPRESS: NORMAL
BH CV LOWER VASCULAR RIGHT LESSER SAPH COMPRESS: NORMAL
BH CV LOWER VASCULAR RIGHT MID FEMORAL AUGMENT: NORMAL
BH CV LOWER VASCULAR RIGHT MID FEMORAL COMPETENT: NORMAL
BH CV LOWER VASCULAR RIGHT MID FEMORAL COMPRESS: NORMAL
BH CV LOWER VASCULAR RIGHT MID FEMORAL PHASIC: NORMAL
BH CV LOWER VASCULAR RIGHT MID FEMORAL SPONT: NORMAL
BH CV LOWER VASCULAR RIGHT PERONEAL COMPRESS: NORMAL
BH CV LOWER VASCULAR RIGHT POPLITEAL AUGMENT: NORMAL
BH CV LOWER VASCULAR RIGHT POPLITEAL COMPETENT: NORMAL
BH CV LOWER VASCULAR RIGHT POPLITEAL COMPRESS: NORMAL
BH CV LOWER VASCULAR RIGHT POPLITEAL PHASIC: NORMAL
BH CV LOWER VASCULAR RIGHT POPLITEAL SPONT: NORMAL
BH CV LOWER VASCULAR RIGHT POSTERIOR TIBIAL COMPRESS: NORMAL
BH CV LOWER VASCULAR RIGHT PROFUNDA FEMORAL COMPRESS: NORMAL
BH CV LOWER VASCULAR RIGHT PROXIMAL FEMORAL COMPRESS: NORMAL
BH CV LOWER VASCULAR RIGHT SAPHENOFEMORAL JUNCTION COMPRESS: NORMAL
BILIRUB SERPL-MCNC: 0.2 MG/DL (ref 0–1.2)
BUN SERPL-MCNC: 15 MG/DL (ref 6–20)
BUN/CREAT SERPL: 16.3 (ref 7–25)
CALCIUM SPEC-SCNC: 9.6 MG/DL (ref 8.6–10.5)
CHLORIDE SERPL-SCNC: 105 MMOL/L (ref 98–107)
CK SERPL-CCNC: 47 U/L (ref 20–180)
CO2 SERPL-SCNC: 23.7 MMOL/L (ref 22–29)
CREAT SERPL-MCNC: 0.92 MG/DL (ref 0.57–1)
DEPRECATED RDW RBC AUTO: 41 FL (ref 37–54)
EGFRCR SERPLBLD CKD-EPI 2021: 75.5 ML/MIN/1.73
EOSINOPHIL # BLD AUTO: 0.35 10*3/MM3 (ref 0–0.4)
EOSINOPHIL NFR BLD AUTO: 5.8 % (ref 0.3–6.2)
ERYTHROCYTE [DISTWIDTH] IN BLOOD BY AUTOMATED COUNT: 12 % (ref 12.3–15.4)
GLOBULIN UR ELPH-MCNC: 2.8 GM/DL
GLUCOSE SERPL-MCNC: 107 MG/DL (ref 65–99)
HCT VFR BLD AUTO: 36.7 % (ref 34–46.6)
HGB BLD-MCNC: 12.6 G/DL (ref 12–15.9)
IMM GRANULOCYTES # BLD AUTO: 0.02 10*3/MM3 (ref 0–0.05)
IMM GRANULOCYTES NFR BLD AUTO: 0.3 % (ref 0–0.5)
LIPASE SERPL-CCNC: 75 U/L (ref 13–60)
LYMPHOCYTES # BLD AUTO: 1.54 10*3/MM3 (ref 0.7–3.1)
LYMPHOCYTES NFR BLD AUTO: 25.4 % (ref 19.6–45.3)
MCH RBC QN AUTO: 31.9 PG (ref 26.6–33)
MCHC RBC AUTO-ENTMCNC: 34.3 G/DL (ref 31.5–35.7)
MCV RBC AUTO: 92.9 FL (ref 79–97)
MONOCYTES # BLD AUTO: 0.49 10*3/MM3 (ref 0.1–0.9)
MONOCYTES NFR BLD AUTO: 8.1 % (ref 5–12)
NEUTROPHILS NFR BLD AUTO: 3.58 10*3/MM3 (ref 1.7–7)
NEUTROPHILS NFR BLD AUTO: 59.1 % (ref 42.7–76)
NRBC BLD AUTO-RTO: 0 /100 WBC (ref 0–0.2)
PLATELET # BLD AUTO: 267 10*3/MM3 (ref 140–450)
PMV BLD AUTO: 11.4 FL (ref 6–12)
POTASSIUM SERPL-SCNC: 3.3 MMOL/L (ref 3.5–5.2)
PROT SERPL-MCNC: 6.8 G/DL (ref 6–8.5)
RBC # BLD AUTO: 3.95 10*6/MM3 (ref 3.77–5.28)
SODIUM SERPL-SCNC: 138 MMOL/L (ref 136–145)
T4 FREE SERPL-MCNC: 1.32 NG/DL (ref 0.92–1.68)
TSH SERPL DL<=0.05 MIU/L-ACNC: 0.42 UIU/ML (ref 0.27–4.2)
WBC NRBC COR # BLD AUTO: 6.06 10*3/MM3 (ref 3.4–10.8)

## 2024-10-22 PROCEDURE — 84443 ASSAY THYROID STIM HORMONE: CPT | Performed by: EMERGENCY MEDICINE

## 2024-10-22 PROCEDURE — 99284 EMERGENCY DEPT VISIT MOD MDM: CPT

## 2024-10-22 PROCEDURE — 84439 ASSAY OF FREE THYROXINE: CPT | Performed by: EMERGENCY MEDICINE

## 2024-10-22 PROCEDURE — 85025 COMPLETE CBC W/AUTO DIFF WBC: CPT | Performed by: EMERGENCY MEDICINE

## 2024-10-22 PROCEDURE — 82550 ASSAY OF CK (CPK): CPT | Performed by: EMERGENCY MEDICINE

## 2024-10-22 PROCEDURE — 83690 ASSAY OF LIPASE: CPT | Performed by: EMERGENCY MEDICINE

## 2024-10-22 PROCEDURE — 80053 COMPREHEN METABOLIC PANEL: CPT | Performed by: EMERGENCY MEDICINE

## 2024-10-22 PROCEDURE — 93970 EXTREMITY STUDY: CPT | Performed by: STUDENT IN AN ORGANIZED HEALTH CARE EDUCATION/TRAINING PROGRAM

## 2024-10-22 PROCEDURE — 93970 EXTREMITY STUDY: CPT

## 2024-10-22 RX ORDER — SODIUM CHLORIDE 0.9 % (FLUSH) 0.9 %
10 SYRINGE (ML) INJECTION AS NEEDED
Status: DISCONTINUED | OUTPATIENT
Start: 2024-10-22 | End: 2024-10-22 | Stop reason: HOSPADM

## 2024-10-22 NOTE — ED PROVIDER NOTES
EMERGENCY DEPARTMENT ENCOUNTER  Room Number:  25/25  PCP: Orin Lundberg MD  Independent Historians: Patient      HPI:  Chief Complaint: had concerns including Leg Pain.     A complete HPI/ROS/PMH/PSH/SH/FH are unobtainable due to: Nothing      Context: Selma Holden is a 49 y.o. female with a medical history of DVT, hypertension, anxiety, thyroid nodule who presents to the ED c/o acute not feeling well for 1 month.  She states that she has had chronic diarrhea, pain over her thyroid gland, and more recently she has developed pain over her shins bilaterally.  She does have a history of the previously.  She denies chest pain, fever, chills.  She denies shortness of breath.  Patient states that she did call her PCP about the symptoms today but they advised her to come to the ER.  She does have a history of a thyroid nodule.      Review of prior external notes (non-ED) -and- Review of prior external test results outside of this encounter: I reviewed cardiology progress note from 10/18/2024.  Patient has a history of DiGeorge syndrome.  She was evaluated for hypertension, Raynaud's phenomenon.  They had recommended diltiazem.  Patient also saw ENT on 10/15/2024 due to complaint of sore throat for 4 weeks.  ENT suspected possible thyroiditis and placed patient on oral steroid course for pain.        PAST MEDICAL HISTORY  Active Ambulatory Problems     Diagnosis Date Noted    Acquired hypothyroidism 04/17/2017    Gastroesophageal reflux disease without esophagitis 04/17/2017    Encounter for long-term (current) use of NSAIDs 04/17/2017    Depression 04/17/2017    History of DVT (deep vein thrombosis) 04/17/2017    Arthritis 04/17/2017    ZAINA (generalized anxiety disorder) 04/17/2017    Essential hypertension 04/17/2017    Slow transit constipation 04/17/2017    Medicare annual wellness visit, initial 04/17/2017    Bilateral hearing loss 04/17/2017    Vitamin D deficiency 04/17/2017    Anemia 04/17/2017    Thyroid  nodule 2017    Hypercoagulable state 2017    Seasonal allergies 2024    CAD (coronary artery disease) 2024    Diarrhea 10/17/2024     Resolved Ambulatory Problems     Diagnosis Date Noted    Menstrual irregularity 2017    Dizziness 2017    Hashimoto's thyroiditis 2017    Pain and swelling of right lower leg 2017    Impacted cerumen of both ears 2017    Chest pain, atypical 2020     Past Medical History:   Diagnosis Date    Anxiety     Colon polyp     DiGeorge anomaly     GERD (gastroesophageal reflux disease)     HL (hearing loss)     Hyperlipidemia     Hypertension     Hyperthyroidism     Hypothyroidism     Inflammatory bowel disease     Pain in left leg 2020    Pain in right leg 2020    Peptic ulceration     Radiculopathy, lumbar region 2020    Raynaud's syndrome without gangrene          PAST SURGICAL HISTORY  Past Surgical History:   Procedure Laterality Date    COSMETIC SURGERY      ENDOMETRIAL ABLATION      FOOT SURGERY      HERNIA REPAIR      OTHER SURGICAL HISTORY      surgery    VEIN SURGERY      WISDOM TOOTH EXTRACTION  1988         FAMILY HISTORY  Family History   Problem Relation Age of Onset    Hypertension Mother     Diabetes Mother     Depression Mother     Lung cancer Mother     Hypertension Father     Sudden death Father 48        MI    Hyperlipidemia Father     Heart disease Father     Hypertension Maternal Grandmother     Cancer Maternal Grandfather     Hypertension Maternal Grandfather     Hypertension Paternal Grandmother     Hypertension Paternal Grandfather          SOCIAL HISTORY  Social History     Socioeconomic History    Marital status: Single   Tobacco Use    Smoking status: Former     Current packs/day: 0.00     Average packs/day: 0.5 packs/day for 15.0 years (7.5 ttl pk-yrs)     Types: Cigarettes     Start date:      Quit date: 2016     Years since quittin.8    Smokeless tobacco: Never   Vaping Use     Vaping status: Never Used   Substance and Sexual Activity    Alcohol use: No    Drug use: No    Sexual activity: Not Currently         ALLERGIES  Gabapentin, Hydrocodone, Pineapple, Pregabalin, Amitriptyline, Codeine, Metoprolol, Amlodipine, Povidone iodine, and Prednisone      REVIEW OF SYSTEMS  Review of all 14 systems is negative other than stated in the HPI above.      PHYSICAL EXAM    I have reviewed the triage vital signs and nursing notes.    ED Triage Vitals   Temp Heart Rate Resp BP SpO2   10/22/24 1348 10/22/24 1348 10/22/24 1348 10/22/24 1434 10/22/24 1348   99 °F (37.2 °C) 120 20 121/81 98 %      Temp src Heart Rate Source Patient Position BP Location FiO2 (%)   10/22/24 1348 -- -- -- --   Tympanic             GENERAL: awake and alert, well-appearing, no acute distress  HENT: Normocephalic, atraumatic, oropharynx clear without erythema or exudate.  There is no appreciable thyromegaly or thyroid tenderness anteriorly.  There is no cervical adenopathy.  Trachea midline.  EYES: no scleral icterus, pupils 3 mm reactive bilaterally, no proptosis or exophthalmos  CV: regular rhythm, regular rate  RESPIRATORY: normal effort  ABDOMEN: soft, nondistended, nontender throughout  MUSCULOSKELETAL: no deformity, mild point tenderness over the anterior shin bilaterally without erythema or warmth.  There is no calf tenderness present bilaterally, no lower extremity edema.  NEURO: alert, moves all extremities, follows commands, cranial nerves II through XII intact, speech fluent and clear  PSYCH: calm, cooperative  SKIN: Warm, dry          LAB RESULTS  Recent Results (from the past 24 hours)   Lipase    Collection Time: 10/22/24  2:33 PM    Specimen: Blood   Result Value Ref Range    Lipase 75 (H) 13 - 60 U/L   TSH    Collection Time: 10/22/24  2:33 PM    Specimen: Blood   Result Value Ref Range    TSH 0.421 0.270 - 4.200 uIU/mL   T4, Free    Collection Time: 10/22/24  2:33 PM    Specimen: Blood   Result Value Ref  Range    Free T4 1.32 0.92 - 1.68 ng/dL   CK    Collection Time: 10/22/24  2:33 PM    Specimen: Blood   Result Value Ref Range    Creatine Kinase 47 20 - 180 U/L   CBC Auto Differential    Collection Time: 10/22/24  2:33 PM    Specimen: Blood   Result Value Ref Range    WBC 6.06 3.40 - 10.80 10*3/mm3    RBC 3.95 3.77 - 5.28 10*6/mm3    Hemoglobin 12.6 12.0 - 15.9 g/dL    Hematocrit 36.7 34.0 - 46.6 %    MCV 92.9 79.0 - 97.0 fL    MCH 31.9 26.6 - 33.0 pg    MCHC 34.3 31.5 - 35.7 g/dL    RDW 12.0 (L) 12.3 - 15.4 %    RDW-SD 41.0 37.0 - 54.0 fl    MPV 11.4 6.0 - 12.0 fL    Platelets 267 140 - 450 10*3/mm3    Neutrophil % 59.1 42.7 - 76.0 %    Lymphocyte % 25.4 19.6 - 45.3 %    Monocyte % 8.1 5.0 - 12.0 %    Eosinophil % 5.8 0.3 - 6.2 %    Basophil % 1.3 0.0 - 1.5 %    Immature Grans % 0.3 0.0 - 0.5 %    Neutrophils, Absolute 3.58 1.70 - 7.00 10*3/mm3    Lymphocytes, Absolute 1.54 0.70 - 3.10 10*3/mm3    Monocytes, Absolute 0.49 0.10 - 0.90 10*3/mm3    Eosinophils, Absolute 0.35 0.00 - 0.40 10*3/mm3    Basophils, Absolute 0.08 0.00 - 0.20 10*3/mm3    Immature Grans, Absolute 0.02 0.00 - 0.05 10*3/mm3    nRBC 0.0 0.0 - 0.2 /100 WBC       The above labs were ordered by me and independently reviewed by me.     RADIOLOGY  Duplex Venous Lower Extremity - Bilateral CAR    Result Date: 10/22/2024  Narrative:   Normal bilateral lower extremity venous duplex scan.        The above radiology studies were ordered by me.  See ED course for independent interpretations.     MEDICATIONS GIVEN IN ER  Medications - No data to display        ORDERS PLACED DURING THIS VISIT:  Orders Placed This Encounter   Procedures    Comprehensive Metabolic Panel    Lipase    TSH    T4, Free    CK    CBC Auto Differential    CBC & Differential         OUTPATIENT MEDICATION MANAGEMENT:  No current Epic-ordered facility-administered medications on file.     Current Outpatient Medications Ordered in Epic   Medication Sig Dispense Refill    albuterol  sulfate  (90 Base) MCG/ACT inhaler Inhale 1 puff Every 4 (Four) Hours As Needed.      aspirin (aspirin) 81 MG EC tablet Take 1 tablet by mouth Daily. 30 tablet 11    azelastine (ASTELIN) 0.1 % nasal spray Administer 1 spray into the nostril(s) as directed by provider.      celecoxib (CeleBREX) 200 MG capsule Take 1 capsule by mouth Daily.      cholestyramine (Questran) 4 g packet Take 1 packet by mouth 3 (Three) Times a Day With Meals. 90 packet 0    desvenlafaxine (Pristiq) 50 MG 24 hr tablet Take 1 tablet by mouth Daily. 90 tablet 0    dilTIAZem CD (CARDIZEM CD) 240 MG 24 hr capsule Take 1 capsule by mouth Daily. 30 capsule 11    EPINEPHrine (EpiPen 2-Isiah) 0.3 MG/0.3ML solution auto-injector injection 0.3 mL.      famotidine (PEPCID) 20 MG tablet Take 1 tablet by mouth 2 (Two) Times a Day.      FeroSul 325 (65 Fe) MG tablet Take 1 tablet by mouth Daily With Breakfast.      hydroCHLOROthiazide 25 MG tablet Take 1 tablet by mouth Daily.      levothyroxine (SYNTHROID, LEVOTHROID) 75 MCG tablet Take 1 tablet by mouth Daily.      Linzess 72 MCG capsule capsule Take 1 capsule by mouth Daily.      losartan (COZAAR) 25 MG tablet Take 0.5 tablets by mouth Daily.      metoprolol succinate XL (TOPROL-XL) 25 MG 24 hr tablet Take 0.5 tablets by mouth Daily.      montelukast (SINGULAIR) 10 MG tablet Take 1 tablet by mouth Every Night.      multivitamin with minerals (HAIR SKIN & NAILS ADVANCED PO) Take 1 tablet by mouth Daily.      nabumetone (RELAFEN) 750 MG tablet Daily.      ofloxacin (FLOXIN) 0.3 % otic solution Administer 5 drops into ear(s) as directed by provider.      OneTouch Ultra test strip 1 each by Other route As Needed.      pantoprazole (PROTONIX) 40 MG EC tablet Take 1 tablet by mouth Daily. 90 tablet 0    potassium chloride (KLOR-CON M20) 20 MEQ CR tablet Take 1 tablet by mouth.      potassium chloride ER (K-TAB) 20 MEQ tablet controlled-release ER tablet Take 1 tablet by mouth 2 (Two) Times a Day.       prednisoLONE acetate (PRED FORTE) 1 % ophthalmic suspension Administer 1 drop to both eyes.      Probiotic Product (ALIGN PO) Take  by mouth.      Restasis 0.05 % ophthalmic emulsion Administer 1 drop to both eyes Every 12 (Twelve) Hours.      rosuvastatin (CRESTOR) 10 MG tablet TAKE ONE TABLET BY MOUTH DAILY 90 tablet 1    topiramate (TOPAMAX) 25 MG tablet Take 1 tablet by mouth 2 (Two) Times a Day. 60 tablet 1    Trelegy Ellipta 100-62.5-25 MCG/ACT inhaler Inhale 1 puff Daily.      vitamin B-12 (CYANOCOBALAMIN) 1000 MCG tablet Take 1 tablet by mouth Daily.           PROCEDURES  Procedures            PROGRESS, DATA ANALYSIS, CONSULTS, AND MEDICAL DECISION MAKING  All labs have been independently interpreted by me.  All radiology studies have been reviewed by me. All EKG's have been independently viewed and interpreted by me.  Discussion below represents my analysis of pertinent findings related to patient's condition, differential diagnosis, treatment plan and final disposition.    Differential diagnosis includes but is not limited to:  Hyperthyroidism  IBS-D  Colitis  Myositis  Myopathy  DVT      Clinical Scores:                  ED Course as of 10/24/24 1620   Tue Oct 22, 2024   1529 Creatine Kinase: 47 [JR]   1529 Free T4: 1.32 [JR]   1529 TSH Baseline: 0.421 [JR]   1529 WBC: 6.06 [JR]   1615 Patient updated on reassuring laboratory findings and plan to proceed with venous duplex of bilateral lower extremities given her history of DVT although my clinical suspicion for acute DVT is low today.  I recommended ongoing follow-up with her PCP regarding her other symptoms. [JR]      ED Course User Index  [JR] Charly Adams MD             AS OF 16:20 EDT VITALS:    BP - 121/81  HR - 120  TEMP - 99 °F (37.2 °C) (Tympanic)  O2 SATS - 98%    COMPLEXITY OF CARE        Chronic or social conditions impacting patient care (Social Determinants of Health):     DIAGNOSIS  Final diagnoses:   Diarrhea, unspecified type    Sore throat   Pain in shin, unspecified laterality           DISPOSITION  DISCHARGE    Patient discharged in stable condition.    Reviewed implications of results, diagnosis, meds, responsibility to follow up, warning signs and symptoms of possible worsening, potential complications and reasons to return to ER.    Patient/Family voiced understanding of above instructions.    Discussed plan for discharge, as there is no emergent indication for admission. Patient referred to primary care provider for BP management due to today's BP. Pt/family is agreeable and understands need for follow up and repeat testing.  Pt is aware that discharge does not mean that nothing is wrong but it indicates no emergency is present that requires admission and they must continue care with follow-up as given below or physician of their choice.     FOLLOW-UP  Orin Lundberg MD  [address] [address]  [phone]    Schedule an appointment as soon as possible for a visit            Medication List      No changes were made to your prescriptions during this visit.             Prescription drug monitoring program review:           Please note that portions of this document were completed with a voice recognition program.    Note Disclaimer: At [facility], we believe that sharing information builds trust and better relationships. You are receiving this note because you recently visited [facility]. It is possible you will see health information before a provider has talked with you about it. This kind of information can be easy to misunderstand. To help you fully understand what it means for your health, we urge you to discuss this note with your provider.         Charly Adams MD  10/24/24 [id number]

## 2024-10-22 NOTE — TELEPHONE ENCOUNTER
HUB call - caller with c/o numbness, tingling to bilateral feet and pain bilateral legs.    Spoke with caller who states has been having pain in both legs over the last week which worsens with ambulation and began experiencing numbness and tingling in Bilateral feet and lower legs this a.m. Denies swelling or discoloration of extremities - maybe right leg slightly puffy.  Denies any recent viral illness.   was placed on 2 new meds recently for BP and cholesterol which she stopped one week ago due to leg pain. EPIC chart indicates these meds were cardizem and Questran.  Caller reports that she has experienced some mild SOA and /? And HR of 61 this a.m. States HR normally runs 90s to 103. Denies dizziness or lightheadedness.  Guidelines followed, protocols reviewed. Due to callers reports of neuro deficits in addition to SOA and significant decrease in HR and stopping of Cardizem one week ago, advised patient to go to ED for further evaluation and treatment. Does have  available.  Caller verbalized understanding and plans to go to ED now.    Reason for Disposition   Patient sounds very sick or weak to the triager    Additional Information   Negative: [1] SEVERE weakness (i.e., unable to walk or barely able to walk, requires support) AND [2] new-onset or worsening   Negative: [1] Weakness (i.e., paralysis, loss of muscle strength) of the face, arm / hand, or leg / foot on one side of the body AND [2] sudden onset AND [3] present now  (Exception: Bell's palsy suspected [i.e., weakness only on one side of the face, developing over hours to days, no other symptoms].)   Negative: [1] Numbness (i.e., loss of sensation) of the face, arm / hand, or leg / foot on one side of the body AND [2] sudden onset AND [3] present now   Negative: [1] Loss of speech or garbled speech AND [2] sudden onset AND [3] present now   Negative: Difficult to awaken or acting confused (e.g., disoriented, slurred speech)   Negative:  "Sounds like a life-threatening emergency to the triager   Negative: Confusion, disorientation, or hallucinations is main symptom   Negative: Neck pain is main symptom (and having weakness, numbness, or tingling in arm / hand because of neck pain)   Negative: Back pain is main symptom (and having weakness, numbness, or tingling in leg because of back pain)   Negative: Hand pain is main symptom (and having mild weakness, numbness, or tingling in hand related to hand pain)   Negative: Dizziness is main symptom   Negative: Vision loss or change is main symptom   Negative: Followed a head injury within last 3 days   Negative: Followed a neck injury within last 3 days   Negative: [1] Tingling in both hands and/or feet AND [2] breathing faster than normal AND [3] feels similar to prior panic attack or hyperventilation episode   Negative: Weakness in both sides of the body or weakness all over   Negative: Headache  (and neurologic deficit)   Negative: [1] Back pain AND [2] numbness (loss of sensation) in groin or rectal area   Negative: [1] Unable to urinate (or only a few drops) > 4 hours AND [2] bladder feels very full (e.g., palpable bladder or strong urge to urinate)   Negative: [1] Loss of bladder or bowel control (urine or bowel incontinence; wetting self, leaking stool) AND [2] new-onset   Negative: [1] Weakness (i.e., paralysis, loss of muscle strength) of the face, arm / hand, or leg / foot on one side of the body AND [2] sudden onset AND [3] brief (now gone)   Negative: [1] Numbness (i.e., loss of sensation) of the face, arm / hand, or leg / foot on one side of the body AND [2] sudden onset AND [3] brief (now gone)   Negative: [1] Loss of speech or garbled speech AND [2] sudden onset AND [3] brief (now gone)   Negative: Bell's palsy suspected (i.e., weakness on only one side of the face, developing over hours to days, no other symptoms)    Answer Assessment - Initial Assessment Questions  1. SYMPTOM: \"What is the " "main symptom you are concerned about?\" (e.g., weakness, numbness)      Numbness, tingling and pain in bilateral legs and feet.  2. ONSET: \"When did this start?\" (minutes, hours, days; while sleeping)      Pain started about one week ago. Numbnes,tingling started this a.m.  3. LAST NORMAL: \"When was the last time you (the patient) were normal (no symptoms)?\"      1 week ago  4. PATTERN \"Does this come and go, or has it been constant since it started?\"  \"Is it present now?\"      Constant  5. CARDIAC SYMPTOMS: \"Have you had any of the following symptoms: chest pain, difficulty breathing, palpitations?\"      States mild SOA  6. NEUROLOGIC SYMPTOMS: \"Have you had any of the following symptoms: headache, dizziness, vision loss, double vision, changes in speech, unsteady on your feet?\"      Pain, numbness and tingling are effecting ambulation. No other neuro symptoms  7. OTHER SYMPTOMS: \"Do you have any other symptoms?\"      States HR is 61 this a.m., usually runs 103  8. PREGNANCY: \"Is there any chance you are pregnant?\" \"When was your last menstrual period?\"      N/A    Protocols used: Neurologic Deficit-ADULT-AH    "

## 2024-10-29 ENCOUNTER — OFFICE VISIT (OUTPATIENT)
Dept: ENDOCRINOLOGY | Age: 49
End: 2024-10-29
Payer: MEDICARE

## 2024-10-29 VITALS
SYSTOLIC BLOOD PRESSURE: 124 MMHG | BODY MASS INDEX: 25.3 KG/M2 | HEIGHT: 63 IN | OXYGEN SATURATION: 98 % | HEART RATE: 106 BPM | DIASTOLIC BLOOD PRESSURE: 80 MMHG | WEIGHT: 142.8 LBS

## 2024-10-29 DIAGNOSIS — R53.83 OTHER FATIGUE: Primary | ICD-10-CM

## 2024-10-29 DIAGNOSIS — E55.9 VITAMIN D DEFICIENCY: ICD-10-CM

## 2024-10-29 DIAGNOSIS — E03.9 HYPOTHYROIDISM, UNSPECIFIED TYPE: ICD-10-CM

## 2024-10-29 DIAGNOSIS — R73.03 PREDIABETES: ICD-10-CM

## 2024-10-29 RX ORDER — MONTELUKAST SODIUM 4 MG/1
1 TABLET, CHEWABLE ORAL EVERY 12 HOURS SCHEDULED
COMMUNITY
Start: 2024-10-15

## 2024-10-29 RX ORDER — DICYCLOMINE HYDROCHLORIDE 10 MG/1
10 CAPSULE ORAL
COMMUNITY
Start: 2024-10-14

## 2024-10-29 NOTE — PROGRESS NOTES
Referring provider: Orin Lundberg MD     Chief complaint/Reason for consult: hypothyroidism    HPI:   - 49 year old female here for hypothyroidism  - Complains of diarrhea and fatigue  - Is on levothyroxine 75 mcg daily  - Also states she has prediabetes, vitamin D deficiency    The following portions of the patient's history were reviewed and updated as appropriate: allergies, current medications, past family history, past medical history, past social history, past surgical history, and problem list.      Objective     Vitals:    10/29/24 1317   BP: 124/80   Pulse: 106   SpO2: 98%        Physical Exam  Vitals reviewed.   Constitutional:       Appearance: Normal appearance.   HENT:      Head: Normocephalic and atraumatic.   Eyes:      General: No scleral icterus.  Pulmonary:      Effort: Pulmonary effort is normal. No respiratory distress.   Neurological:      Mental Status: She is alert.      Gait: Gait normal.   Psychiatric:         Mood and Affect: Mood normal.         Behavior: Behavior normal.         Thought Content: Thought content normal.         Judgment: Judgment normal.         Assessment & Plan   Hypothyroidism  - Is on levothyroxine 75 mcg daily  - Will check TFT's    2. Vitamin D deficiency  - Will check vitamin D    3. Fatigue  - Will check CMP, vitamin B12, folate, vitamin B6    - Return to clinic in 3 months

## 2024-11-01 LAB
25(OH)D3+25(OH)D2 SERPL-MCNC: 38.1 NG/ML (ref 30–100)
ALBUMIN SERPL-MCNC: 4.6 G/DL (ref 3.5–5.2)
ALBUMIN/GLOB SERPL: 1.6 G/DL
ALP SERPL-CCNC: 90 U/L (ref 39–117)
ALT SERPL-CCNC: 17 U/L (ref 1–33)
AST SERPL-CCNC: 22 U/L (ref 1–32)
BILIRUB SERPL-MCNC: 0.4 MG/DL (ref 0–1.2)
BUN SERPL-MCNC: 15 MG/DL (ref 6–20)
BUN/CREAT SERPL: 14.6 (ref 7–25)
CALCIUM SERPL-MCNC: 10.5 MG/DL (ref 8.6–10.5)
CHLORIDE SERPL-SCNC: 100 MMOL/L (ref 98–107)
CO2 SERPL-SCNC: 27.2 MMOL/L (ref 22–29)
CREAT SERPL-MCNC: 1.03 MG/DL (ref 0.57–1)
EGFRCR SERPLBLD CKD-EPI 2021: 66 ML/MIN/1.73
FOLATE SERPL-MCNC: 14 NG/ML (ref 4.78–24.2)
GLOBULIN SER CALC-MCNC: 2.9 GM/DL
GLUCOSE SERPL-MCNC: 61 MG/DL (ref 65–99)
HBA1C MFR BLD: 5.3 % (ref 4.8–5.6)
POTASSIUM SERPL-SCNC: 3.5 MMOL/L (ref 3.5–5.2)
PROT SERPL-MCNC: 7.5 G/DL (ref 6–8.5)
SODIUM SERPL-SCNC: 139 MMOL/L (ref 136–145)
T3FREE SERPL-MCNC: 2.5 PG/ML (ref 2–4.4)
T4 FREE SERPL-MCNC: 1.52 NG/DL (ref 0.92–1.68)
TSH SERPL DL<=0.005 MIU/L-ACNC: 0.83 UIU/ML (ref 0.27–4.2)
VIT B12 SERPL-MCNC: 479 PG/ML (ref 211–946)

## 2024-11-06 LAB — PYRIDOXAL PHOS SERPL-MCNC: 19.7 UG/L (ref 3.4–65.2)

## 2024-11-14 ENCOUNTER — TELEPHONE (OUTPATIENT)
Dept: FAMILY MEDICINE CLINIC | Facility: CLINIC | Age: 49
End: 2024-11-14

## 2024-11-14 ENCOUNTER — TELEPHONE (OUTPATIENT)
Dept: GASTROENTEROLOGY | Facility: CLINIC | Age: 49
End: 2024-11-14
Payer: MEDICARE

## 2024-11-14 RX ORDER — TOPIRAMATE 25 MG/1
25 TABLET, FILM COATED ORAL 2 TIMES DAILY
Qty: 180 TABLET | Refills: 3 | Status: SHIPPED | OUTPATIENT
Start: 2024-11-14

## 2024-11-14 RX ORDER — HYDROCHLOROTHIAZIDE 25 MG/1
25 TABLET ORAL DAILY
Qty: 90 TABLET | Refills: 3 | Status: SHIPPED | OUTPATIENT
Start: 2024-11-14

## 2024-11-14 RX ORDER — ROSUVASTATIN CALCIUM 10 MG/1
10 TABLET, COATED ORAL NIGHTLY
Qty: 90 TABLET | Refills: 3 | Status: SHIPPED | OUTPATIENT
Start: 2024-11-14

## 2024-11-14 NOTE — TELEPHONE ENCOUNTER
Caller: Selma Holden    Relationship: Self    Best call back number: [phone]     Requested Prescriptions:   Requested Prescriptions     Pending Prescriptions Disp Refills    hydroCHLOROthiazide 25 MG tablet       Sig: Take 1 tablet by mouth Daily.    rosuvastatin (CRESTOR) 10 MG tablet 90 tablet 1     Sig: Take 1 tablet by mouth Daily.    topiramate (TOPAMAX) 25 MG tablet 60 tablet 1     Sig: Take 1 tablet by mouth 2 (Two) Times a Day.        Pharmacy where request should be sent: [facility] [id number] AT [facility].  [phone]  - [phone] FX     Last office visit with prescribing clinician: 10/14/2024   Last telemedicine visit with prescribing clinician: Visit date not found   Next office visit with prescribing clinician: 12/10/2024     Additional details provided by patient: PATIENT STATED SHE HAS ABOUT 7 TABLETS REMAINING OF HYDROCHLOROTHIAZIDE.    SHE HAS ABOUT 10 TABLETS REMAINING OF ROSUVASTATIN    AND 8 OR 9 TABLETS REMAINING OF TOPIRMATE.    PATIENT STATED SHE NORMALLY RECEIVES A 90 DAY SUPPLY OF HYDROCHLOROTHIAZIDE.    PATIENT STATED SHE IS NOW USING [employer] INSTEAD OF [facility], AND WILL NEED HER PRESCRIPTIONS SENT TO [facility] BY DR SCHMITZ.    Does the patient have less than a 3 day supply:  [] Yes  [x] No    Would you like a call back once the refill request has been completed: [] Yes [x] No    If the office needs to give you a call back, can they leave a voicemail: [] Yes [x] No    Jyoti Deleon Rep   11/14/24 12:09 EST

## 2024-11-14 NOTE — TELEPHONE ENCOUNTER
Caller: Selma Holden    Relationship: Self    Best call back number: [phone]     Which medication are you concerned about: metoprolol succinate XL (TOPROL-XL) 25 MG 24 hr tablet AND dilTIAZem CD (CARDIZEM CD) 240 MG 24 hr capsule     Who prescribed you this medication: [facility]    When did you start taking this medication:STARTED  metoprolol succinate XL (TOPROL-XL) 25 MG 24 hr tablet YEARS AGO,  STARTED dilTIAZem CD (CARDIZEM CD) 240 MG 24 hr capsule ON 11-    What are your concerns: PATIENT STATED SHE WAS PREVIOUSLY TAKING  metoprolol succinate XL (TOPROL-XL) 25 MG 24 hr tablet FOR HER BLOOD PRESSURE.    PATIENT STATED SHE WAS DIAGNOSED WITH COVID ON 11- BY THE [facility] BY A PROVIDER NAMED RAJIV.    PATIENT STATED AT THIS VISIT SHE WAS PRESCRIBED A NEW BLOOD PRESSURE MEDICATION dilTIAZem CD (CARDIZEM CD) 240 MG 24 hr capsule.    PATIENT STATED SHE STOPPED TAKING METOPROLOL WHEN SHE STARTED TAKING DILTIAZEM, AND THE NEW MEDICATION HAS BEEN WORKING WELL FOR HER.    PATIENT STATED THE DIRECTIONS ON HER NEW BLOOD PRESSURE MEDICATION DO NOT SPECIFY WHETHER OR NOT TO STOP THE PREVIOUS BLOOD PRESSURE MEDICATION, METOPROLOL.    PATIENT IS REQUESTING TO KNOW IF SHE SHOULD STILL BE TAKING METOPROLOL WITH DILTIAZEM, OR IF SHE SHOULD ONLY TAKE DILTIAZEM.    PLEASE CONTACT PATIENT TO ADVISE.

## 2024-11-14 NOTE — TELEPHONE ENCOUNTER
Rx Refill Note  Requested Prescriptions     Pending Prescriptions Disp Refills    hydroCHLOROthiazide 25 MG tablet       Sig: Take 1 tablet by mouth Daily.    rosuvastatin (CRESTOR) 10 MG tablet 90 tablet 1     Sig: Take 1 tablet by mouth Daily.    topiramate (TOPAMAX) 25 MG tablet 60 tablet 1     Sig: Take 1 tablet by mouth 2 (Two) Times a Day.      Last office visit with prescribing clinician: 10/14/2024   Last telemedicine visit with prescribing clinician: Visit date not found   Next office visit with prescribing clinician: 12/10/2024                         Would you like a call back once the refill request has been completed: [] Yes [] No    If the office needs to give you a call back, can they leave a voicemail: [] Yes [] No    Anel Estrada MA  11/14/24, 13:45 EST

## 2024-11-19 RX ORDER — METOPROLOL SUCCINATE 25 MG/1
12.5 TABLET, EXTENDED RELEASE ORAL DAILY
Start: 2024-11-19

## 2024-11-19 RX ORDER — FERROUS SULFATE 325(65) MG
1 TABLET ORAL
Qty: 90 TABLET | Refills: 0 | Status: SHIPPED | OUTPATIENT
Start: 2024-11-19

## 2024-11-19 NOTE — TELEPHONE ENCOUNTER
Caller: Navin Selma K    Relationship: Self    Best call back number: [phone]     Requested Prescriptions:   Requested Prescriptions     Pending Prescriptions Disp Refills    metoprolol succinate XL (TOPROL-XL) 25 MG 24 hr tablet       Sig: Take 0.5 tablets by mouth Daily.    FeroSul 325 (65 Fe) MG tablet       Sig: Take 1 tablet by mouth Daily With Breakfast.        Pharmacy where request should be sent: [facility] [phone] [address] AT [facility].  [phone] [facility] [phone] FX     Last office visit with prescribing clinician: 10/14/2024   Last telemedicine visit with prescribing clinician: Visit date not found   Next office visit with prescribing clinician: 12/10/2024     Additional details provided by patient: PATIENT STATED SHE ALSO NEEDS LEVOTHYROXINE 88 MG REFILLED.    PATIENT STATED SHE HAS ABOUT A WEEK REMAINING OF THESE MEDICATIONS    Does the patient have less than a 3 day supply:  [] Yes  [x] No    Would you like a call back once the refill request has been completed: [] Yes [x] No    If the office needs to give you a call back, can they leave a voicemail: [] Yes [x] No    Jyoti Deleon Rep   11/19/24 08:39 EST

## 2024-11-19 NOTE — TELEPHONE ENCOUNTER
Rx Refill Note  Requested Prescriptions     Pending Prescriptions Disp Refills    metoprolol succinate XL (TOPROL-XL) 25 MG 24 hr tablet       Sig: Take 0.5 tablets by mouth Daily.    FeroSul 325 (65 Fe) MG tablet 90 tablet 0     Sig: Take 1 tablet by mouth Daily With Breakfast.      Last office visit with prescribing clinician: 10/14/2024   Last telemedicine visit with prescribing clinician: Visit date not found   Next office visit with prescribing clinician: 12/10/2024                         Would you like a call back once the refill request has been completed: [] Yes [] No    If the office needs to give you a call back, can they leave a voicemail: [] Yes [] No    ELAINE Marshall  11/19/24, 08:52 [facility] Refill Note  Requested Prescriptions     Pending Prescriptions Disp Refills    metoprolol succinate XL (TOPROL-XL) 25 MG 24 hr tablet       Sig: Take 0.5 tablets by mouth Daily.    FeroSul 325 (65 Fe) MG tablet 90 tablet 0     Sig: Take 1 tablet by mouth Daily With Breakfast.      Last office visit with prescribing clinician: 10/14/2024   Last telemedicine visit with prescribing clinician: Visit date not found   Next office visit with prescribing clinician: 12/10/2024                         Would you like a call back once the refill request has been completed: [] Yes [] No    If the office needs to give you a call back, can they leave a voicemail: [] Yes [] No    ELAINE Marshall  11/19/24, 08:52 EST

## 2024-11-20 DIAGNOSIS — Z00.00 ENCOUNTER FOR ANNUAL HEALTH EXAMINATION: Primary | ICD-10-CM

## 2024-12-09 ENCOUNTER — OFFICE VISIT (OUTPATIENT)
Dept: CARDIOLOGY | Facility: CLINIC | Age: 49
End: 2024-12-09
Payer: MEDICARE

## 2024-12-09 VITALS
SYSTOLIC BLOOD PRESSURE: 122 MMHG | BODY MASS INDEX: 25.87 KG/M2 | HEIGHT: 63 IN | HEART RATE: 95 BPM | WEIGHT: 146 LBS | DIASTOLIC BLOOD PRESSURE: 80 MMHG

## 2024-12-09 DIAGNOSIS — I10 ESSENTIAL HYPERTENSION: ICD-10-CM

## 2024-12-09 DIAGNOSIS — I25.10 CORONARY ARTERY DISEASE INVOLVING NATIVE CORONARY ARTERY OF NATIVE HEART WITHOUT ANGINA PECTORIS: Primary | ICD-10-CM

## 2024-12-09 PROCEDURE — 99214 OFFICE O/P EST MOD 30 MIN: CPT | Performed by: INTERNAL MEDICINE

## 2024-12-09 PROCEDURE — 3079F DIAST BP 80-89 MM HG: CPT | Performed by: INTERNAL MEDICINE

## 2024-12-09 PROCEDURE — 3074F SYST BP LT 130 MM HG: CPT | Performed by: INTERNAL MEDICINE

## 2024-12-10 ENCOUNTER — OFFICE VISIT (OUTPATIENT)
Dept: FAMILY MEDICINE CLINIC | Facility: CLINIC | Age: 49
End: 2024-12-10
Payer: MEDICARE

## 2024-12-10 VITALS
TEMPERATURE: 97.3 F | HEIGHT: 63 IN | OXYGEN SATURATION: 97 % | DIASTOLIC BLOOD PRESSURE: 84 MMHG | SYSTOLIC BLOOD PRESSURE: 136 MMHG | BODY MASS INDEX: 25.69 KG/M2 | HEART RATE: 100 BPM | WEIGHT: 145 LBS

## 2024-12-10 DIAGNOSIS — Z00.00 MEDICARE ANNUAL WELLNESS VISIT, SUBSEQUENT: Primary | ICD-10-CM

## 2024-12-10 PROCEDURE — 1159F MED LIST DOCD IN RCRD: CPT | Performed by: STUDENT IN AN ORGANIZED HEALTH CARE EDUCATION/TRAINING PROGRAM

## 2024-12-10 PROCEDURE — 1160F RVW MEDS BY RX/DR IN RCRD: CPT | Performed by: STUDENT IN AN ORGANIZED HEALTH CARE EDUCATION/TRAINING PROGRAM

## 2024-12-10 PROCEDURE — 1170F FXNL STATUS ASSESSED: CPT | Performed by: STUDENT IN AN ORGANIZED HEALTH CARE EDUCATION/TRAINING PROGRAM

## 2024-12-10 PROCEDURE — 3075F SYST BP GE 130 - 139MM HG: CPT | Performed by: STUDENT IN AN ORGANIZED HEALTH CARE EDUCATION/TRAINING PROGRAM

## 2024-12-10 PROCEDURE — G0439 PPPS, SUBSEQ VISIT: HCPCS | Performed by: STUDENT IN AN ORGANIZED HEALTH CARE EDUCATION/TRAINING PROGRAM

## 2024-12-10 PROCEDURE — 3079F DIAST BP 80-89 MM HG: CPT | Performed by: STUDENT IN AN ORGANIZED HEALTH CARE EDUCATION/TRAINING PROGRAM

## 2024-12-10 RX ORDER — LANCETS 33 GAUGE
EACH MISCELLANEOUS
COMMUNITY
Start: 2024-10-29

## 2024-12-10 RX ORDER — MONTELUKAST SODIUM 4 MG/1
2 TABLET, CHEWABLE ORAL 2 TIMES DAILY
Qty: 180 TABLET | Refills: 0 | Status: SHIPPED | OUTPATIENT
Start: 2024-12-10

## 2024-12-10 RX ORDER — DILTIAZEM HYDROCHLORIDE 240 MG/1
240 CAPSULE, COATED, EXTENDED RELEASE ORAL DAILY
COMMUNITY
Start: 2024-12-09 | End: 2024-12-16

## 2024-12-10 RX ORDER — LOSARTAN POTASSIUM 25 MG/1
12.5 TABLET ORAL DAILY
Qty: 90 TABLET | Refills: 0 | Status: SHIPPED | OUTPATIENT
Start: 2024-12-10

## 2024-12-10 NOTE — PROGRESS NOTES
"Chief Complaint  Annual Exam, Diarrhea (Pt is still having concerns.), and Weight Loss (Pt would like to explore weight loss medication.)    Subjective    {Problem List  Visit Diagnosis   Encounters  Notes  Medications  Labs  Result Review Imaging  Media :23}    Selma Holden presents to [facility]  History of Present Illness    Objective   Vital Signs:  /84   Pulse 100   Temp 97.3 °F (36.3 °C)   Ht 160 cm (63\")   Wt 65.8 kg (145 lb)   SpO2 97%   BMI 25.69 kg/m²   Estimated body mass index is 25.69 kg/m² as calculated from the following:    Height as of this encounter: 160 cm (63\").    Weight as of this encounter: 65.8 kg (145 lb).    {BMI is >= 25 and <30. (Overweight) The following options were offered after discussion; (Optional):00661}      Physical Exam   Result Review :{Labs  Result Review  Imaging  Med Tab  Media  Procedures :23}  The following data was reviewed by: Orin Lundberg MD on 12/10/2024:  Common labs          10/22/2024    14:33 10/29/2024    13:45 12/3/2024    08:24   Common Labs   Glucose 107  61  CANCELED    BUN 15  15  17    Creatinine 0.92  1.03  1.01    Sodium 138  139  137    Potassium 3.3  3.5  CANCELED    Chloride 105  100  100    Calcium 9.6  10.5  10.2    Total Protein  7.5  7.1    Albumin 4.0  4.6  4.6    Total Bilirubin 0.2  0.4  0.4    Alkaline Phosphatase 80  90  82    AST (SGOT) 24  22  21    ALT (SGPT) 21  17  20    WBC 6.06      Hemoglobin 12.6      Hematocrit 36.7      Platelets 267      Total Cholesterol   146    Triglycerides   131    HDL Cholesterol   37    LDL Cholesterol    86    Hemoglobin A1C  5.30       Data reviewed : See HPI above           Assessment and Plan {CC Problem List  Visit Diagnosis   ROS  Review (Popup)  Health Maintenance  Quality  BestPractice  Medications  SmartSets  SnapShot Encounters  Media :23}  There are no diagnoses linked to this encounter.       {Time Spent " (Optional):24802}  Follow Up {Instructions Charge Capture  Follow-up Communications :23}  No follow-ups on file.  Patient was given instructions and counseling regarding her condition or for health maintenance advice. Please see specific information pulled into the AVS if appropriate.

## 2024-12-11 NOTE — PROGRESS NOTES
"      CARDIOLOGY    Roger Mendez MD    ENCOUNTER DATE:  12/09/2024    Selma Holden / 49 y.o. / female        CHIEF COMPLAINT / REASON FOR OFFICE VISIT     Essential hypertension (10/18/2024 follow up)  Coronary artery disease    HISTORY OF PRESENT ILLNESS       HPI  Selma Holden is a 49 y.o. female who presents today for reevaluation.  Patient has been getting some tingling in her arms.  She did have a sleep study at [facility] that reportedly was negative.  She says her shortness of breath is significantly better off the diltiazem.  Blood pressure looks fantastic.  I think at this point we still want to continue to wean some medications.      The following portions of the patient's history were reviewed and updated as appropriate: allergies, current medications, past family history, past medical history, past social history, past surgical history and problem list.      VITAL SIGNS     Visit Vitals  /80 (BP Location: Left arm)   Pulse 95   Ht 160 cm (63\")   Wt 66.2 kg (146 lb)   BMI 25.86 kg/m²         Wt Readings from Last 3 Encounters:   12/10/24 65.8 kg (145 lb)   12/09/24 66.2 kg (146 lb)   11/22/24 64.9 kg (143 lb)     Body mass index is 25.86 kg/m².      REVIEW OF SYSTEMS   ROS        PHYSICAL EXAMINATION     Vitals reviewed.   Constitutional:       Appearance: Healthy appearance.   Pulmonary:      Effort: Pulmonary effort is normal.   Cardiovascular:      Normal rate. Regular rhythm. Normal S1. Normal S2.       Murmurs: There is no murmur.      No gallop.  No click. No rub.   Pulses:     Intact distal pulses.   Edema:     Peripheral edema absent.   Neurological:      Mental Status: Alert.           REVIEWED DATA     Procedures    Cardiac Procedures:      Lipid Panel          12/3/2024    08:24   Lipid Panel   Total Cholesterol 146    Triglycerides 131    HDL Cholesterol 37    VLDL Cholesterol 23    LDL Cholesterol  86    LDL/HDL Ratio 2.3          ASSESSMENT & PLAN      Diagnosis Plan   1. " Coronary artery disease involving native coronary artery of native heart without angina pectoris        2. Essential hypertension              SUMMARY/DISCUSSION  Hypertension.  Blood pressure remains good.  She is doing better off the diltiazem.  She getting some tingling in her arms that obviously could be coming out of her neck but it could also be coming from her beta-blocker.  I want to stop this her beta-blocker to see if it helps her symptoms.  Follow-up in 8 weeks we will see if her symptoms improve or not improved.        MEDICATIONS         Discharge Medications            Accurate as of December 9, 2024 11:59 PM. If you have any questions, ask your nurse or doctor.                Changes to Medications        Instructions Start Date   aspirin 81 MG EC tablet  What changed:   when to take this  reasons to take this   81 mg, Oral, Daily      levothyroxine 75 MCG tablet  Commonly known as: SYNTHROID, LEVOTHROID  What changed: how much to take   75 mcg, Daily      metoprolol succinate XL 25 MG 24 hr tablet  Commonly known as: TOPROL-XL  What changed: how much to take   12.5 mg, Oral, Daily             Continue These Medications        Instructions Start Date   albuterol sulfate  (90 Base) MCG/ACT inhaler  Commonly known as: PROVENTIL HFA;VENTOLIN HFA;PROAIR HFA   1 puff, Every 4 Hours PRN      ALIGN PO   Take  by mouth.      amoxicillin-clavulanate 875-125 MG per tablet  Commonly known as: AUGMENTIN   1 tablet, Oral, 2 Times Daily      celecoxib 200 MG capsule  Commonly known as: CeleBREX   200 mg, Daily      cholestyramine 4 g packet  Commonly known as: Questran   1 packet, Oral, 3 Times Daily With Meals      colestipol 1 g tablet  Commonly known as: COLESTID   1 tablet, Every 12 Hours Scheduled      dicyclomine 10 MG capsule  Commonly known as: BENTYL   10 mg, 4 Times Daily Before Meals & Nightly      EpiPen 2-Isiah 0.3 MG/0.3ML solution auto-injector injection  Generic drug: EPINEPHrine   0.3 mg       famotidine 20 MG tablet  Commonly known as: PEPCID   20 mg, 2 Times Daily      FeroSul 325 (65 Fe) MG tablet  Generic drug: ferrous sulfate   325 mg, Oral, Daily With Breakfast      fexofenadine 180 MG tablet  Commonly known as: Allegra Allergy   180 mg, Oral, Daily      hydroCHLOROthiazide 25 MG tablet   25 mg, Oral, Daily      Linzess 72 MCG capsule capsule  Generic drug: linaclotide   1 capsule, Daily      losartan 25 MG tablet  Commonly known as: COZAAR   12.5 mg, Oral, Daily      montelukast 10 MG tablet  Commonly known as: SINGULAIR   10 mg, Nightly      multivitamin with minerals tablet tablet   1 tablet, Daily      nabumetone 750 MG tablet  Commonly known as: RELAFEN   Daily      OneTouch Ultra test strip  Generic drug: glucose blood   1 each, As Needed      pantoprazole 40 MG EC tablet  Commonly known as: PROTONIX   40 mg, Oral, Daily      potassium chloride ER 20 MEQ tablet controlled-release ER tablet  Commonly known as: K-TAB   20 mEq, 2 Times Daily      rosuvastatin 10 MG tablet  Commonly known as: CRESTOR   10 mg, Oral, Nightly      topiramate 25 MG tablet  Commonly known as: TOPAMAX   25 mg, Oral, 2 Times Daily      Trelegy Ellipta 100-62.5-25 MCG/ACT inhaler  Generic drug: Fluticasone-Umeclidin-Vilant   1 puff, Daily      vitamin B-12 1000 MCG tablet  Commonly known as: CYANOCOBALAMIN   1,000 mcg, Daily             Stop These Medications      potassium chloride 20 MEQ CR tablet  Commonly known as: KLOR-CON M20  Stopped by: Roger Mendez                  **Dragon Disclaimer:   Much of this encounter note is an electronic transcription/translation of spoken language to printed text. The electronic translation of spoken language may permit erroneous, or at times, nonsensical words or phrases to be inadvertently transcribed. Although I have reviewed the note for such errors, some may still exist.

## 2024-12-16 NOTE — PROGRESS NOTES
Subjective   The ABCs of the Annual Wellness Visit  Medicare Wellness Visit      Selma Holden is a 49 y.o. patient who presents for a Medicare Wellness Visit.    The following portions of the patient's history were reviewed and   updated as appropriate: allergies, current medications, past family history, past medical history, past social history, past surgical history, and problem list.    Compared to one year ago, the patient's physical   health is the same.  Compared to one year ago, the patient's mental   health is the same.    Recent Hospitalizations:  She was not admitted to the hospital during the last year.     Current Medical Providers:  Patient Care Team:  Orin Lundberg MD as PCP - General (Family Medicine)  Vinicio Weiss MD as Consulting Physician (Cardiology)    Outpatient Medications Prior to Visit   Medication Sig Dispense Refill    albuterol sulfate  (90 Base) MCG/ACT inhaler Inhale 1 puff Every 4 (Four) Hours As Needed.      aspirin (aspirin) 81 MG EC tablet Take 1 tablet by mouth Daily. (Patient taking differently: Take 1 tablet by mouth As Needed.) 30 tablet 11    celecoxib (CeleBREX) 200 MG capsule Take 1 capsule by mouth Daily.      dicyclomine (BENTYL) 10 MG capsule Take 1 capsule by mouth 4 (Four) Times a Day Before Meals & at Bedtime.      EPINEPHrine (EpiPen 2-Isiah) 0.3 MG/0.3ML solution auto-injector injection 0.3 mL.      famotidine (PEPCID) 20 MG tablet Take 1 tablet by mouth 2 (Two) Times a Day.      FeroSul 325 (65 Fe) MG tablet Take 1 tablet by mouth Daily With Breakfast. 90 tablet 0    hydroCHLOROthiazide 25 MG tablet Take 1 tablet by mouth Daily. 90 tablet 3    Lancets (OneTouch Delica Plus [id number]) misc       levothyroxine (SYNTHROID, LEVOTHROID) 75 MCG tablet Take 1 tablet by mouth Daily. (Patient taking differently: Take 88 mcg by mouth Daily.)      Linzess 72 MCG capsule capsule Take 1 capsule by mouth Daily.      losartan (COZAAR) 25 MG tablet Take 0.5  tablets by mouth Daily. 90 tablet 0    montelukast (SINGULAIR) 10 MG tablet Take 1 tablet by mouth Every Night.      multivitamin with minerals (HAIR SKIN & NAILS ADVANCED PO) Take 1 tablet by mouth Daily.      nabumetone (RELAFEN) 750 MG tablet Daily.      OneTouch Ultra test strip 1 each by Other route As Needed.      pantoprazole (PROTONIX) 40 MG EC tablet Take 1 tablet by mouth Daily. 90 tablet 0    potassium chloride ER (K-TAB) 20 MEQ tablet controlled-release ER tablet Take 1 tablet by mouth 2 (Two) Times a Day.      Probiotic Product (ALIGN PO) Take  by mouth.      rosuvastatin (CRESTOR) 10 MG tablet Take 1 tablet by mouth Every Night. 90 tablet 3    topiramate (TOPAMAX) 25 MG tablet Take 1 tablet by mouth 2 (Two) Times a Day. 180 tablet 3    Trelegy Ellipta 100-62.5-25 MCG/ACT inhaler Inhale 1 puff Daily.      vitamin B-12 (CYANOCOBALAMIN) 1000 MCG tablet Take 1 tablet by mouth Daily.      amoxicillin-clavulanate (AUGMENTIN) 875-125 MG per tablet Take 1 tablet by mouth 2 (Two) Times a Day. 20 tablet 0    cholestyramine (Questran) 4 g packet Take 1 packet by mouth 3 (Three) Times a Day With Meals. 90 packet 0    colestipol (COLESTID) 1 g tablet Take 1 tablet by mouth Every 12 (Twelve) Hours.      fexofenadine (Allegra Allergy) 180 MG tablet Take 1 tablet by mouth Daily for 14 days. 14 tablet 0    dilTIAZem CD (CARDIZEM CD) 240 MG 24 hr capsule Take 1 capsule by mouth Daily. (Patient not taking: Reported on 12/10/2024)      metoprolol succinate XL (TOPROL-XL) 25 MG 24 hr tablet Take 0.5 tablets by mouth Daily. (Patient not taking: Reported on 12/10/2024)       No facility-administered medications prior to visit.     No opioid medication identified on active medication list. I have reviewed chart for other potential  high risk medication/s and harmful drug interactions in the elderly.      Aspirin is on active medication list. Aspirin use is indicated based on review of current medical condition/s. Pros and  "cons of this therapy have been discussed today. Benefits of this medication outweigh potential harm.  Patient has been encouraged to continue taking this medication.  .      Patient Active Problem List   Diagnosis    Acquired hypothyroidism    Gastroesophageal reflux disease without esophagitis    Encounter for long-term (current) use of NSAIDs    Depression    History of DVT (deep vein thrombosis)    Arthritis    ZAINA (generalized anxiety disorder)    Essential hypertension    Slow transit constipation    Medicare annual wellness visit, subsequent    Bilateral hearing loss    Vitamin D deficiency    Anemia    Thyroid nodule    Hypercoagulable state    Seasonal allergies    CAD (coronary artery disease)    Diarrhea     Advance Care Planning Advance Directive is not on file.  ACP discussion was held with the patient during this visit. Patient does not have an advance directive, information provided.            Objective   Vitals:    12/10/24 1036   BP: 136/84   Pulse: 100   Temp: 97.3 °F (36.3 °C)   SpO2: 97%   Weight: 65.8 kg (145 lb)   Height: 160 cm (63\")       Estimated body mass index is 25.69 kg/m² as calculated from the following:    Height as of this encounter: 160 cm (63\").    Weight as of this encounter: 65.8 kg (145 lb).    BMI is >= 25 and <30. (Overweight) The following options were offered after discussion;: exercise counseling/recommendations and nutrition counseling/recommendations       Does the patient have evidence of cognitive impairment? No  Lab Results   Component Value Date    CHLPL 146 12/03/2024    TRIG 131 12/03/2024    HDL 37 (L) 12/03/2024    LDL 86 12/03/2024    VLDL 23 12/03/2024    HGBA1C 5.30 10/29/2024                                                                                               Health  Risk Assessment    Smoking Status:  Social History     Tobacco Use   Smoking Status Former    Current packs/day: 0.00    Average packs/day: 0.5 packs/day for 15.0 years (7.5 ttl pk-yrs) "    Types: Cigarettes    Start date:     Quit date: 2016    Years since quittin.9    Passive exposure: Past   Smokeless Tobacco Never     Alcohol Consumption:  Social History     Substance and Sexual Activity   Alcohol Use No       Fall Risk Screen  STEADI Fall Risk Assessment has not been completed.    Depression Screening   The PHQ has not been completed during this encounter.     Health Habits and Functional and Cognitive Screenin/16/2024    10:00 AM   Functional & Cognitive Status   Do you have difficulty preparing food and eating? No   Do you have difficulty bathing yourself, getting dressed or grooming yourself? No   Do you have difficulty using the toilet? No   Do you have difficulty moving around from place to place? No   Do you have trouble with steps or getting out of a bed or a chair? No   Current Diet Well Balanced Diet   Dental Exam Up to date   Eye Exam Up to date   Exercise (times per week) 2 times per week   Current Exercises Include Walking   Do you need help using the phone?  No   Are you deaf or do you have serious difficulty hearing?  No   Do you need help to go to places out of walking distance? No   Do you need help shopping? No   Do you need help preparing meals?  No   Do you need help with housework?  No   Do you need help with laundry? No   Do you need help taking your medications? No   Do you need help managing money? No   Do you ever drive or ride in a car without wearing a seat belt? No   Have you felt unusual stress, anger or loneliness in the last month? No   If you need help, do you have trouble finding someone available to you? No   Have you been bothered in the last four weeks by sexual problems? No   Do you have difficulty concentrating, remembering or making decisions? Yes           Age-appropriate Screening Schedule:  Refer to the list below for future screening recommendations based on patient's age, sex and/or medical conditions. Orders for these recommended  tests are listed in the plan section. The patient has been provided with a written plan.    Health Maintenance List  Health Maintenance   Topic Date Due    COLORECTAL CANCER SCREENING  Never done    Pneumococcal Vaccine 0-64 (1 of 2 - PCV) Never done    TDAP/TD VACCINES (1 - Tdap) Never done    HEPATITIS C SCREENING  Never done    ANNUAL WELLNESS VISIT  04/17/2018    BMI FOLLOWUP  04/17/2018    PAP SMEAR  10/16/2019    ZOSTER VACCINE (1 of 2) Never done    LIPID PANEL  12/03/2025    MAMMOGRAM  09/18/2026    COVID-19 Vaccine  Completed    INFLUENZA VACCINE  Completed                                                                                                                                                CMS Preventative Services Quick Reference  Risk Factors Identified During Encounter  Immunizations Discussed/Encouraged: Tdap, Prevnar 20 (Pneumococcal 20-valent conjugate), and Shingrix  Dental Screening Recommended  Vision Screening Recommended    The above risks/problems have been discussed with the patient.  Pertinent information has been shared with the patient in the After Visit Summary.  An After Visit Summary and PPPS were made available to the patient.    Follow Up:   Next Medicare Wellness visit to be scheduled in 1 year.     Assessment & Plan  Medicare annual wellness visit, subsequent  Patient was counseled in regards to maintaining a healthy lifestyle, rich in whole grains, fruits and vegetables. Limit high saturated fats and processed sugars. Maintain an active lifestyle to promote overall health and well being.            49-year-old female with DiGeorge syndrome, hypothyroidism, cluster headaches, CAD who presents for annual wellness exam.    Diarrhea - post CCY. Bile acid sequestrant is helpful - prefers pill to granules.  Has f/u scheduled with GI 1/2 which I encouraged her to keep. She is due for cscope for CRC screening of which she is aware. No red flag symptoms.     Coronary artery disease  and hypertension are well-controlled.  Recently saw Dr. Mendez.  He recommended stopping beta-blocker to see if this helps with tingling in her arms.  She is on aspirin 81 mg daily.  She is also on rosuvastatin 10 mg daily.  Hypertension is well-controlled on hydrochlorothiazide 25 mg daily and losartan 12.5 mg daily.    She does have degenerative disc disease in cervical spine.  I did recommend physical therapy to see if this would help with her symptoms but she declined.  She does take as needed Celebrex but this is rare.    She is seeing endocrinology for hypothyroidism.  This is stable.  She has history of Raynaud's disease and plans to establish with rheumatology regarding this.  Appointment upcoming.  Symptoms are stable since coming off of diltiazem.    Follow Up:   No follow-ups on file.

## 2024-12-23 DIAGNOSIS — R94.6 ABNORMAL RESULTS OF THYROID FUNCTION STUDIES: ICD-10-CM

## 2024-12-23 DIAGNOSIS — R79.89 ABNORMAL TSH: Primary | ICD-10-CM

## 2025-01-02 ENCOUNTER — OFFICE VISIT (OUTPATIENT)
Dept: GASTROENTEROLOGY | Facility: CLINIC | Age: 50
End: 2025-01-02
Payer: MEDICARE

## 2025-01-02 ENCOUNTER — TELEPHONE (OUTPATIENT)
Dept: GASTROENTEROLOGY | Facility: CLINIC | Age: 50
End: 2025-01-02
Payer: MEDICARE

## 2025-01-02 ENCOUNTER — PATIENT ROUNDING (BHMG ONLY) (OUTPATIENT)
Dept: GASTROENTEROLOGY | Facility: CLINIC | Age: 50
End: 2025-01-02
Payer: MEDICARE

## 2025-01-02 VITALS
TEMPERATURE: 97.5 F | SYSTOLIC BLOOD PRESSURE: 120 MMHG | WEIGHT: 148.6 LBS | HEIGHT: 64 IN | BODY MASS INDEX: 25.37 KG/M2 | DIASTOLIC BLOOD PRESSURE: 92 MMHG | HEART RATE: 45 BPM

## 2025-01-02 DIAGNOSIS — R19.7 DIARRHEA, UNSPECIFIED TYPE: ICD-10-CM

## 2025-01-02 DIAGNOSIS — R10.84 GENERALIZED ABDOMINAL PAIN: ICD-10-CM

## 2025-01-02 DIAGNOSIS — K62.5 RECTAL BLEEDING: ICD-10-CM

## 2025-01-02 DIAGNOSIS — K21.9 GASTROESOPHAGEAL REFLUX DISEASE, UNSPECIFIED WHETHER ESOPHAGITIS PRESENT: Primary | ICD-10-CM

## 2025-01-02 PROCEDURE — 1160F RVW MEDS BY RX/DR IN RCRD: CPT | Performed by: INTERNAL MEDICINE

## 2025-01-02 PROCEDURE — 1159F MED LIST DOCD IN RCRD: CPT | Performed by: INTERNAL MEDICINE

## 2025-01-02 PROCEDURE — 99204 OFFICE O/P NEW MOD 45 MIN: CPT | Performed by: INTERNAL MEDICINE

## 2025-01-02 PROCEDURE — 3074F SYST BP LT 130 MM HG: CPT | Performed by: INTERNAL MEDICINE

## 2025-01-02 PROCEDURE — 3080F DIAST BP >= 90 MM HG: CPT | Performed by: INTERNAL MEDICINE

## 2025-01-02 RX ORDER — ONDANSETRON 4 MG/1
TABLET, ORALLY DISINTEGRATING ORAL
COMMUNITY
Start: 2024-11-10

## 2025-01-02 RX ORDER — OMEPRAZOLE 40 MG/1
40 CAPSULE, DELAYED RELEASE ORAL DAILY
Qty: 30 CAPSULE | Refills: 11 | Status: SHIPPED | OUTPATIENT
Start: 2025-01-02

## 2025-01-02 NOTE — PROGRESS NOTES
January 2, 2025    Hello, may I speak with Selma Holden?    My name is Elyse      I am  with [facility]  [address] [place] [id number] [phone].    Before we get started may I verify your date of birth? 1975    I am calling to officially welcome you to our practice and ask about your recent visit. Is this a good time to talk? yes    Tell me about your visit with us. What things went well?  It went pretty well and he is going to do some tests. Everyone was wonderful. He has a wonderful staff. Dr. Oliver is very pleasant to work with and listens very well.        We're always looking for ways to make our patients' experiences even better. Do you have recommendations on ways we may improve?  Yes, the only thing I can say is that the wait was a little rough. I waited for about 2 hours before I was seen by the provider.     Overall were you satisfied with your first visit to our practice? yes       I appreciate you taking the time to speak with me today. Is there anything else I can do for you? no      Thank you, and have a great day.

## 2025-01-02 NOTE — PROGRESS NOTES
Chief Complaint   Patient presents with    Diarrhea    Heartburn    Abdominal Pain       History of Present Illness:   49 y.o. female c/o chronic diarrhea x several yrs (since cholecystectomy 20 yrs ago). Upper and lower abd pain since 7/24. She has up to 5 BM/day. +dark stool. Some rectal bleeding. Abdominal bloating. H/o Digeorge Syndrome. She is gaining weight. No nausea or vomiting. NO fevers, chiills. Nonsmoker. Rare ETOH. Lives with significant other. 3 sons.        Recent EGD/CLS- 9/22/2020 (Dr. Sanchez):  EGD- erosive antral gastriltis, negative HP  CLS- 4 polyps- 2- 5 mm in size all hyperplastic and  sigmoid diverticlosis.     Past Medical History:   Diagnosis Date    Anemia     Anxiety     Arthritis     CAD (coronary artery disease) 9/9/2024    Colon polyp     DiGeorge anomaly 2021    GERD (gastroesophageal reflux disease)     HL (hearing loss)     Hyperlipidemia     Hypertension     Hyperthyroidism     Hypothyroidism     Inflammatory bowel disease     Pain in left leg 12/04/2020    Pain in right leg 12/04/2020    Peptic ulceration     Radiculopathy, lumbar region 12/04/2020    Raynaud's syndrome without gangrene        Past Surgical History:   Procedure Laterality Date    COSMETIC SURGERY      ENDOMETRIAL ABLATION      FOOT SURGERY      HERNIA REPAIR      OTHER SURGICAL HISTORY      surgery    VEIN SURGERY      WISDOM TOOTH EXTRACTION  1988         Current Outpatient Medications:     albuterol sulfate  (90 Base) MCG/ACT inhaler, Inhale 1 puff Every 4 (Four) Hours As Needed., Disp: , Rfl:     aspirin (aspirin) 81 MG EC tablet, Take 1 tablet by mouth Daily. (Patient taking differently: Take 1 tablet by mouth As Needed.), Disp: 30 tablet, Rfl: 11    celecoxib (CeleBREX) 200 MG capsule, Take 1 capsule by mouth Daily., Disp: , Rfl:     [START ON 1/13/2025] Dupilumab (DUPIXENT) 300 MG/2ML solution auto-injector injection, Inject 2 mL under the skin into the appropriate area as directed., Disp: , Rfl:      EPINEPHrine (EpiPen 2-Isiah) 0.3 MG/0.3ML solution auto-injector injection, 0.3 mL., Disp: , Rfl:     famotidine (PEPCID) 20 MG tablet, Take 1 tablet by mouth 2 (Two) Times a Day., Disp: , Rfl:     FeroSul 325 (65 Fe) MG tablet, Take 1 tablet by mouth Daily With Breakfast., Disp: 90 tablet, Rfl: 0    fexofenadine (Allegra Allergy) 180 MG tablet, Take 1 tablet by mouth Daily for 14 days., Disp: 14 tablet, Rfl: 0    hydroCHLOROthiazide 25 MG tablet, Take 1 tablet by mouth Daily., Disp: 90 tablet, Rfl: 3    Lancets (OneTouch Delica Plus [id number]) misc, , Disp: , Rfl:     levothyroxine (SYNTHROID, LEVOTHROID) 75 MCG tablet, Take 1 tablet by mouth Daily. (Patient taking differently: Take 88 mcg by mouth Daily.), Disp: , Rfl:     Linzess 72 MCG capsule capsule, Take 1 capsule by mouth Daily., Disp: , Rfl:     losartan (COZAAR) 25 MG tablet, Take 0.5 tablets by mouth Daily., Disp: 90 tablet, Rfl: 0    multivitamin with minerals (HAIR SKIN & NAILS ADVANCED PO), Take 1 tablet by mouth Daily., Disp: , Rfl:     ondansetron ODT (ZOFRAN-ODT) 4 MG disintegrating tablet, , Disp: , Rfl:     OneTouch Ultra test strip, 1 each by Other route As Needed., Disp: , Rfl:     potassium chloride ER (K-TAB) 20 MEQ tablet controlled-release ER tablet, Take 1 tablet by mouth 2 (Two) Times a Day., Disp: , Rfl:     Probiotic Product (ALIGN PO), Take  by mouth., Disp: , Rfl:     rosuvastatin (CRESTOR) 10 MG tablet, Take 1 tablet by mouth Every Night., Disp: 90 tablet, Rfl: 3    topiramate (TOPAMAX) 25 MG tablet, Take 1 tablet by mouth 2 (Two) Times a Day., Disp: 180 tablet, Rfl: 3    Trelegy Ellipta 100-62.5-25 MCG/ACT inhaler, Inhale 1 puff Daily., Disp: , Rfl:     vitamin B-12 (CYANOCOBALAMIN) 1000 MCG tablet, Take 1 tablet by mouth Daily., Disp: , Rfl:     colestipol (COLESTID) 1 g tablet, Take 2 tablets by mouth 2 (Two) Times a Day. (Patient not taking: Reported on 1/2/2025), Disp: 180 tablet, Rfl: 0    dicyclomine (BENTYL) 10 MG capsule, Take  "1 capsule by mouth 4 (Four) Times a Day Before Meals & at Bedtime. (Patient not taking: Reported on 2025), Disp: , Rfl:     omeprazole (priLOSEC) 40 MG capsule, Take 1 capsule by mouth Daily., Disp: 30 capsule, Rfl: 11    Allergies   Allergen Reactions    Gabapentin Rash    Hydrocodone Rash     Other reaction(s): Insomnia    Pineapple Hives and Diarrhea     Other reaction(s): Blisters    Other reaction(s): Blisters   Other reaction(s): Blisters    Pregabalin Hives and Rash    Amitriptyline Other (See Comments)    Codeine Itching    Metoprolol Hives     Heart racing    Amlodipine Rash    Povidone Iodine Rash    Prednisone Rash       Family History   Problem Relation Age of Onset    Hypertension Mother     Diabetes Mother     Depression Mother     Lung cancer Mother     Hypertension Father     Sudden death Father 48        MI    Hyperlipidemia Father     Heart disease Father     Hypertension Maternal Grandmother     Cancer Maternal Grandfather     Hypertension Maternal Grandfather     Hypertension Paternal Grandmother     Hypertension Paternal Grandfather        Social History     Socioeconomic History    Marital status: Single   Tobacco Use    Smoking status: Former     Current packs/day: 0.00     Average packs/day: 0.5 packs/day for 15.0 years (7.5 ttl pk-yrs)     Types: Cigarettes     Start date:      Quit date: 2016     Years since quittin.0     Passive exposure: Past    Smokeless tobacco: Never   Vaping Use    Vaping status: Never Used   Substance and Sexual Activity    Alcohol use: No    Drug use: No    Sexual activity: Not Currently       Review of Systems   Gastrointestinal:  Positive for abdominal pain and diarrhea.   All other systems reviewed and are negative.    Pertinent positives and negatives documented in the HPI and all other systems reviewed and were found to be negative.  /92   Pulse (!) 45   Temp 97.5 °F (36.4 °C) (Temporal)   Ht 161.5 cm (63.6\")   Wt 67.4 kg (148 lb 9.6 oz)  "  BMI 25.83 kg/m²       Physical Exam  Vitals reviewed.   Constitutional:       General: She is not in acute distress.     Appearance: Normal appearance. She is well-developed. She is not diaphoretic.   HENT:      Head: Normocephalic and atraumatic. Hair is normal.      Right Ear: Hearing, tympanic membrane, ear canal and external ear normal. No decreased hearing noted. No drainage.      Left Ear: Hearing, tympanic membrane, ear canal and external ear normal. No decreased hearing noted.      Nose: Nose normal. No nasal deformity.      Mouth/Throat:      Mouth: Mucous membranes are moist.   Eyes:      General: Lids are normal.         Right eye: No discharge.         Left eye: No discharge.      Extraocular Movements: Extraocular movements intact.      Conjunctiva/sclera: Conjunctivae normal.      Pupils: Pupils are equal, round, and reactive to light.   Neck:      Thyroid: No thyromegaly.      Vascular: No JVD.      Trachea: No tracheal deviation.   Cardiovascular:      Rate and Rhythm: Normal rate and regular rhythm.      Pulses: Normal pulses.      Heart sounds: Normal heart sounds. No murmur heard.     No friction rub. No gallop.   Pulmonary:      Effort: Pulmonary effort is normal. No respiratory distress.      Breath sounds: Normal breath sounds. No wheezing or rales.   Chest:      Chest wall: No tenderness.   Abdominal:      General: Bowel sounds are normal. There is no distension.      Palpations: Abdomen is soft. There is no mass.      Tenderness: There is no abdominal tenderness. There is no guarding or rebound.      Hernia: No hernia is present.   Genitourinary:     Rectum: Normal. Guaiac result negative.   Musculoskeletal:         General: No tenderness or deformity. Normal range of motion.      Cervical back: Normal range of motion and neck supple.   Lymphadenopathy:      Cervical: No cervical adenopathy.   Skin:     General: Skin is warm and dry.      Findings: No erythema or rash.   Neurological:       Mental Status: She is alert and oriented to person, place, and time.      Cranial Nerves: No cranial nerve deficit.      Motor: No abnormal muscle tone.      Coordination: Coordination normal.      Deep Tendon Reflexes: Reflexes are normal and symmetric. Reflexes normal.   Psychiatric:         Mood and Affect: Mood normal.         Behavior: Behavior normal.         Thought Content: Thought content normal.         Judgment: Judgment normal.         Diagnoses and all orders for this visit:    1. Gastroesophageal reflux disease, unspecified whether esophagitis present (Primary)  -     omeprazole (priLOSEC) 40 MG capsule; Take 1 capsule by mouth Daily.  Dispense: 30 capsule; Refill: 11  -     Amylase  -     Lipase  -     CBC & Differential  -     Comprehensive Metabolic Panel  -     Celiac Ab tTG DGP TIgA  -     TSH  -     Clostridioides difficile EIA - Stool, Per Rectum  -     Fecal Leukocytes - Stool, Per Rectum  -     Giardia Antigen - Stool, Per Rectum  -     Stool Culture (Reference Lab) - Stool, Per Rectum  -     Ova & Parasite Examination - Stool, Per Rectum  -     Case Request; Standing  -     Case Request  -     CT Abdomen Pelvis With Contrast; Future    2. Diarrhea, unspecified type  -     Amylase  -     Lipase  -     CBC & Differential  -     Comprehensive Metabolic Panel  -     Celiac Ab tTG DGP TIgA  -     TSH  -     Clostridioides difficile EIA - Stool, Per Rectum  -     Fecal Leukocytes - Stool, Per Rectum  -     Giardia Antigen - Stool, Per Rectum  -     Stool Culture (Reference Lab) - Stool, Per Rectum  -     Ova & Parasite Examination - Stool, Per Rectum  -     Case Request; Standing  -     Case Request  -     CT Abdomen Pelvis With Contrast; Future    3. Rectal bleeding  -     Amylase  -     Lipase  -     CBC & Differential  -     Comprehensive Metabolic Panel  -     Celiac Ab tTG DGP TIgA  -     TSH  -     Clostridioides difficile EIA - Stool, Per Rectum  -     Fecal Leukocytes - Stool, Per Rectum  -      Giardia Antigen - Stool, Per Rectum  -     Stool Culture (Reference Lab) - Stool, Per Rectum  -     Ova & Parasite Examination - Stool, Per Rectum  -     Case Request; Standing  -     Case Request  -     CT Abdomen Pelvis With Contrast; Future    4. Generalized abdominal pain  -     Amylase  -     Lipase  -     CBC & Differential  -     Comprehensive Metabolic Panel  -     Celiac Ab tTG DGP TIgA  -     TSH  -     Clostridioides difficile EIA - Stool, Per Rectum  -     Fecal Leukocytes - Stool, Per Rectum  -     Giardia Antigen - Stool, Per Rectum  -     Stool Culture (Reference Lab) - Stool, Per Rectum  -     Ova & Parasite Examination - Stool, Per Rectum  -     Case Request; Standing  -     Case Request  -     CT Abdomen Pelvis With Contrast; Future    Other orders  -     Implement Anesthesia orders day of procedure.; Standing  -     Follow Anesthesia Guidelines / Protocol; Future  -     Verify bowel prep was successful; Standing  -     Give tap water enema if bowel prep was insufficient; Standing      Assessment:  Chronic diarrhea, she is on Linzess  Melena  REctal bleeding  Heartburn  Abdominal pain - CT abd/pelvis in 8/24 was unremarkable.  Upper abd bloating      Recommendations:  Labs  Stool studies.  EGD and colonoscopy.  Change from Protonix to Omeprazole 40 mg/day  Stop Linzess  CT abd/pelvis    No follow-ups on file.    Sea Oliver MD  1/2/2025

## 2025-01-02 NOTE — TELEPHONE ENCOUNTER
SIMI CARDENAS IN SCHEDULING PT SCHEDULED 02/25/2025 ARRIVING AT 0900AM CLS/EGD PREP INFORMATION HANDED TO THE PT OK FOR THE HUB TO RELAY

## 2025-01-03 LAB
ALBUMIN SERPL-MCNC: 4.6 G/DL (ref 3.5–5.2)
ALBUMIN/GLOB SERPL: 1.7 G/DL
ALP SERPL-CCNC: 74 U/L (ref 39–117)
ALT SERPL-CCNC: 45 U/L (ref 1–33)
AMYLASE SERPL-CCNC: 79 U/L (ref 28–100)
AST SERPL-CCNC: 36 U/L (ref 1–32)
BASOPHILS # BLD AUTO: 0.11 10*3/MM3 (ref 0–0.2)
BASOPHILS NFR BLD AUTO: 1.6 % (ref 0–1.5)
BILIRUB SERPL-MCNC: 0.5 MG/DL (ref 0–1.2)
BUN SERPL-MCNC: 14 MG/DL (ref 6–20)
BUN/CREAT SERPL: 15.2 (ref 7–25)
CALCIUM SERPL-MCNC: 10.3 MG/DL (ref 8.6–10.5)
CHLORIDE SERPL-SCNC: 103 MMOL/L (ref 98–107)
CO2 SERPL-SCNC: 25.9 MMOL/L (ref 22–29)
CREAT SERPL-MCNC: 0.92 MG/DL (ref 0.57–1)
EGFRCR SERPLBLD CKD-EPI 2021: 75.5 ML/MIN/1.73
EOSINOPHIL # BLD AUTO: 0.55 10*3/MM3 (ref 0–0.4)
EOSINOPHIL NFR BLD AUTO: 8 % (ref 0.3–6.2)
ERYTHROCYTE [DISTWIDTH] IN BLOOD BY AUTOMATED COUNT: 12 % (ref 12.3–15.4)
GLIADIN PEPTIDE IGA SER-ACNC: 2 UNITS (ref 0–19)
GLIADIN PEPTIDE IGG SER-ACNC: 2 UNITS (ref 0–19)
GLOBULIN SER CALC-MCNC: 2.7 GM/DL
GLUCOSE SERPL-MCNC: 86 MG/DL (ref 65–99)
HCT VFR BLD AUTO: 42.1 % (ref 34–46.6)
HGB BLD-MCNC: 13.7 G/DL (ref 12–15.9)
IGA SERPL-MCNC: 155 MG/DL (ref 87–352)
IMM GRANULOCYTES # BLD AUTO: 0.02 10*3/MM3 (ref 0–0.05)
IMM GRANULOCYTES NFR BLD AUTO: 0.3 % (ref 0–0.5)
LIPASE SERPL-CCNC: 58 U/L (ref 13–60)
LYMPHOCYTES # BLD AUTO: 2 10*3/MM3 (ref 0.7–3.1)
LYMPHOCYTES NFR BLD AUTO: 29.2 % (ref 19.6–45.3)
MCH RBC QN AUTO: 30.6 PG (ref 26.6–33)
MCHC RBC AUTO-ENTMCNC: 32.5 G/DL (ref 31.5–35.7)
MCV RBC AUTO: 94 FL (ref 79–97)
MONOCYTES # BLD AUTO: 0.44 10*3/MM3 (ref 0.1–0.9)
MONOCYTES NFR BLD AUTO: 6.4 % (ref 5–12)
NEUTROPHILS # BLD AUTO: 3.72 10*3/MM3 (ref 1.7–7)
NEUTROPHILS NFR BLD AUTO: 54.5 % (ref 42.7–76)
NRBC BLD AUTO-RTO: 0 /100 WBC (ref 0–0.2)
PLATELET # BLD AUTO: 265 10*3/MM3 (ref 140–450)
POTASSIUM SERPL-SCNC: 4.3 MMOL/L (ref 3.5–5.2)
PROT SERPL-MCNC: 7.3 G/DL (ref 6–8.5)
RBC # BLD AUTO: 4.48 10*6/MM3 (ref 3.77–5.28)
SODIUM SERPL-SCNC: 138 MMOL/L (ref 136–145)
TSH SERPL DL<=0.005 MIU/L-ACNC: 1.44 UIU/ML (ref 0.27–4.2)
TTG IGA SER-ACNC: <2 U/ML (ref 0–3)
TTG IGG SER-ACNC: <2 U/ML (ref 0–5)
WBC # BLD AUTO: 6.84 10*3/MM3 (ref 3.4–10.8)

## 2025-01-06 ENCOUNTER — TELEPHONE (OUTPATIENT)
Dept: GASTROENTEROLOGY | Facility: CLINIC | Age: 50
End: 2025-01-06
Payer: MEDICARE

## 2025-01-06 DIAGNOSIS — R79.89 ELEVATED LFTS: Primary | ICD-10-CM

## 2025-01-06 DIAGNOSIS — Z11.59 ENCOUNTER FOR SCREENING FOR OTHER VIRAL DISEASES: ICD-10-CM

## 2025-01-06 NOTE — TELEPHONE ENCOUNTER
Please see if Nuzhat has enough blood from her recent lab draw to run a hepatitis profile (to evaluate her elevated liver function tests)?

## 2025-01-07 ENCOUNTER — LAB REQUISITION (OUTPATIENT)
Dept: LAB | Facility: HOSPITAL | Age: 50
End: 2025-01-07
Payer: MEDICARE

## 2025-01-07 DIAGNOSIS — R19.7 DIARRHEA, UNSPECIFIED: ICD-10-CM

## 2025-01-07 DIAGNOSIS — R10.84 GENERALIZED ABDOMINAL PAIN: ICD-10-CM

## 2025-01-07 DIAGNOSIS — K62.5 HEMORRHAGE OF ANUS AND RECTUM: ICD-10-CM

## 2025-01-07 DIAGNOSIS — K21.9 GASTRO-ESOPHAGEAL REFLUX DISEASE WITHOUT ESOPHAGITIS: ICD-10-CM

## 2025-01-07 PROCEDURE — 87177 OVA AND PARASITES SMEARS: CPT | Performed by: INTERNAL MEDICINE

## 2025-01-07 PROCEDURE — 87046 STOOL CULTR AEROBIC BACT EA: CPT | Performed by: INTERNAL MEDICINE

## 2025-01-07 PROCEDURE — 87324 CLOSTRIDIUM AG IA: CPT | Performed by: INTERNAL MEDICINE

## 2025-01-07 PROCEDURE — 87329 GIARDIA AG IA: CPT | Performed by: INTERNAL MEDICINE

## 2025-01-07 PROCEDURE — 87209 SMEAR COMPLEX STAIN: CPT | Performed by: INTERNAL MEDICINE

## 2025-01-07 PROCEDURE — 87045 FECES CULTURE AEROBIC BACT: CPT | Performed by: INTERNAL MEDICINE

## 2025-01-07 PROCEDURE — 87205 SMEAR GRAM STAIN: CPT | Performed by: INTERNAL MEDICINE

## 2025-01-07 PROCEDURE — 87427 SHIGA-LIKE TOXIN AG IA: CPT | Performed by: INTERNAL MEDICINE

## 2025-01-07 NOTE — TELEPHONE ENCOUNTER
Called [facility], spoke to Jeremias who stated he can't add an acute hepatitis profile but can try to split it into the following tests;  Hep A IGM  Hep B surface antigen  Hep B core Igm   HCV antibodies   Orders placed  He is going to see if there is enough blood to add this.  Will await fax

## 2025-01-08 LAB
C DIFF TOX A+B STL QL IA: NEGATIVE
G LAMBLIA AG STL QL IA: NEGATIVE
HAV IGM SERPL QL IA: NEGATIVE
HBV CORE IGM SERPL QL IA: NEGATIVE
HBV SURFACE AG SERPL QL IA: NEGATIVE
HCV IGG SERPL QL IA: NON REACTIVE
O+P SPEC MICRO: NORMAL
O+P STL CONC: NORMAL
WBC STL QL MICRO: NORMAL
WBC STL QL MICRO: NORMAL

## 2025-01-09 NOTE — TELEPHONE ENCOUNTER
Called [facility] at [phone] and she reports that the tests were done and she will fax them to [phone].    Lab results received and scanned under media tab. Dr Oliver updated.

## 2025-01-09 NOTE — PROGRESS NOTES
01/09/25       Tell her that her stool studies came back normal.  We will see what the CAT scan of the abdomen and pelvis and the EGD and colonoscopy show?  Please send a copy of this report to her PCP.  Niesha christie

## 2025-01-11 LAB
BACTERIA SPEC CULT: NORMAL
BACTERIA SPEC CULT: NORMAL
CAMPYLOBACTER STL CULT: NORMAL
E COLI SXT STL QL IA: NEGATIVE
SALM + SHIG STL CULT: NORMAL

## 2025-01-13 ENCOUNTER — TELEPHONE (OUTPATIENT)
Dept: GASTROENTEROLOGY | Facility: CLINIC | Age: 50
End: 2025-01-13
Payer: MEDICARE

## 2025-01-13 NOTE — TELEPHONE ENCOUNTER
----- Message from Sea Oliver sent at 1/9/2025  5:39 PM EST -----  01/09/25       Tell her that her stool studies came back normal.  We will see what the CAT scan of the abdomen and pelvis and the EGD and colonoscopy show?  Please send a copy of this report to her PCP.  Edenx. kjh

## 2025-01-13 NOTE — TELEPHONE ENCOUNTER
Called pt and advised of Dr Oliver's note. Verb understanding.     Results sent to pcp thru [facility].

## 2025-01-13 NOTE — TELEPHONE ENCOUNTER
----- Message from Sea Oliver sent at 1/9/2025  5:38 PM EST -----  01/09/25       Tell her that her lab work shows that her liver function tests are just mildly elevated but her hepatitis profile is negative.  The other labs look good.  Please send a copy of this report to her PCP.  Niesha christie

## 2025-01-16 ENCOUNTER — HOSPITAL ENCOUNTER (OUTPATIENT)
Dept: CT IMAGING | Facility: HOSPITAL | Age: 50
Discharge: HOME | End: 2025-01-16
Admitting: INTERNAL MEDICINE
Payer: MEDICARE

## 2025-01-16 DIAGNOSIS — R19.7 DIARRHEA, UNSPECIFIED TYPE: ICD-10-CM

## 2025-01-16 DIAGNOSIS — K21.9 GASTROESOPHAGEAL REFLUX DISEASE, UNSPECIFIED WHETHER ESOPHAGITIS PRESENT: ICD-10-CM

## 2025-01-16 DIAGNOSIS — R10.84 GENERALIZED ABDOMINAL PAIN: ICD-10-CM

## 2025-01-16 DIAGNOSIS — K62.5 RECTAL BLEEDING: ICD-10-CM

## 2025-01-16 PROCEDURE — 25510000002 DIATRIZOATE MEGLUMINE & SODIUM PER 1 ML: Performed by: INTERNAL MEDICINE

## 2025-01-16 PROCEDURE — 74177 CT ABD & PELVIS W/CONTRAST: CPT

## 2025-01-16 PROCEDURE — 25510000001 IOPAMIDOL 61 % SOLUTION: Performed by: INTERNAL MEDICINE

## 2025-01-16 RX ORDER — IOPAMIDOL 612 MG/ML
100 INJECTION, SOLUTION INTRAVASCULAR
Status: COMPLETED | OUTPATIENT
Start: 2025-01-16 | End: 2025-01-16

## 2025-01-16 RX ORDER — DIATRIZOATE MEGLUMINE AND DIATRIZOATE SODIUM 660; 100 MG/ML; MG/ML
30 SOLUTION ORAL; RECTAL
Status: COMPLETED | OUTPATIENT
Start: 2025-01-16 | End: 2025-01-16

## 2025-01-16 RX ADMIN — IOPAMIDOL 85 ML: 612 INJECTION, SOLUTION INTRAVENOUS at 11:09

## 2025-01-16 RX ADMIN — DIATRIZOATE MEGLUMINE AND DIATRIZOATE SODIUM 30 ML: 660; 100 LIQUID ORAL; RECTAL at 11:09

## 2025-01-21 RX ORDER — COLESTIPOL HYDROCHLORIDE 1 G/1
2 TABLET ORAL 2 TIMES DAILY
Qty: 180 TABLET | Refills: 0 | Status: SHIPPED | OUTPATIENT
Start: 2025-01-21

## 2025-01-21 NOTE — TELEPHONE ENCOUNTER
Rx Refill Note  Requested Prescriptions     Pending Prescriptions Disp Refills    colestipol (COLESTID) 1 g tablet [Pharmacy Med Name: COLESTIPOL HCL 1 GM TABLET] 180 tablet 0     Sig: TAKE 2 TABLETS BY MOUTH TWICE A DAY      Last office visit with prescribing clinician: 12/10/2024   Last telemedicine visit with prescribing clinician: Visit date not found   Next office visit with prescribing clinician: 12/17/2025                         Would you like a call back once the refill request has been completed: [] Yes [] No    If the office needs to give you a call back, can they leave a voicemail: [] Yes [] No    Lavon Busby CMA/LMR  01/21/25, 09:07 EST

## 2025-01-23 ENCOUNTER — TELEPHONE (OUTPATIENT)
Dept: GASTROENTEROLOGY | Facility: CLINIC | Age: 50
End: 2025-01-23

## 2025-01-23 ENCOUNTER — LAB (OUTPATIENT)
Dept: LAB | Facility: HOSPITAL | Age: 50
End: 2025-01-23
Payer: MEDICARE

## 2025-01-23 PROCEDURE — 86709 HEPATITIS A IGM ANTIBODY: CPT | Performed by: INTERNAL MEDICINE

## 2025-01-23 PROCEDURE — 86705 HEP B CORE ANTIBODY IGM: CPT | Performed by: INTERNAL MEDICINE

## 2025-01-28 ENCOUNTER — TELEPHONE (OUTPATIENT)
Dept: GASTROENTEROLOGY | Facility: CLINIC | Age: 50
End: 2025-01-28
Payer: MEDICARE

## 2025-01-28 DIAGNOSIS — R79.89 ELEVATED LFTS: Primary | ICD-10-CM

## 2025-01-28 NOTE — TELEPHONE ENCOUNTER
----- Message from Sea Oliver sent at 1/25/2025  7:27 PM EST -----  01/25/25       Tell her that the CT of the abdomen pelvis showed some cysts in the kidneys.  There are 2 nonobstructing 2 to 3 mm left kidney stones.  She has sigmoid colon diverticulosis with moderate stool in the colon.       We will see what the EGD and colonoscopy show?       Please send a copy of this report to her PCP.  Adama. kjh

## 2025-01-28 NOTE — TELEPHONE ENCOUNTER
----- Message from Sea Oliver sent at 1/25/2025  7:31 PM EST -----  01/25/25       On the day of her EGD and colonoscopy in 2/25 have [facility] draw another CMP to reevaluate her liver function tests that were mildly elevated the last time we checked them.  Thx. kjh

## 2025-01-28 NOTE — TELEPHONE ENCOUNTER
Cmp order placed .  Called [facility] at [phone] and spoke with Leilani. Advised of Dr Oliver's note. Verb understanding.

## 2025-01-28 NOTE — TELEPHONE ENCOUNTER
Called pt and advised of Dr Oliver's note. Also advised pt when she has her endoscopies she will need to get lab done.  Verb understanding.     Results sent to pcp thru [facility].

## 2025-01-29 ENCOUNTER — TELEPHONE (OUTPATIENT)
Dept: GASTROENTEROLOGY | Facility: CLINIC | Age: 50
End: 2025-01-29

## 2025-01-29 LAB — SPECIMEN STATUS: NORMAL

## 2025-01-29 NOTE — TELEPHONE ENCOUNTER
Hub staff attempted to follow warm transfer process and was unsuccessful     Caller: PANDA SMART    Relationship to patient: SELF    Best call back number: [phone]     Patient is needing: PT IS WONDERING IF SHE NEEDS TO GO TO THE HOSPITAL AND GET HER LABS DONE OR CAN SHE GET THEM DRAWN WHEN SHE HAS HER PROCEDURE PLEASE ADVISE AND CALL PT BACK

## 2025-01-29 NOTE — TELEPHONE ENCOUNTER
Called pt and advised that she can get her labs done on the day of her procedure in endo when they are getting her ready.  Verb understanding.

## 2025-02-12 ENCOUNTER — TELEPHONE (OUTPATIENT)
Dept: GASTROENTEROLOGY | Facility: CLINIC | Age: 50
End: 2025-02-12
Payer: MEDICARE

## 2025-02-12 NOTE — TELEPHONE ENCOUNTER
Hub staff attempted to follow warm transfer process and was unsuccessful     Caller: Selma Downs    Relationship to patient: SelfSELF    Best call back number: [phone]     Patient is needing: PT IS WANTING TO KNOW IF THE LABS NEED TO BE DONE BEFORE HER COLONOSCOPY AND EGD ON 2/25/25 PLEASE ADVISE AND CALL PT BACK

## 2025-02-12 NOTE — TELEPHONE ENCOUNTER
Hub staff attempted to follow warm transfer process and was unsuccessful     Caller: PANDA SMART    Relationship to patient: SELF    Best call back number: [phone]     Patient is needing: PT IS WANTING TO KNOW IF THE LABS NEED TO BE DONE BEFORE HER COLONOSCOPY AND EGD ON 2/25/25 PLEASE ADVISE AND CALL PT BACK

## 2025-02-17 NOTE — TELEPHONE ENCOUNTER
Improved lactate likely due to CRRT. Unclear etiology of acidosis - did not improve after improved oxygenation, no refractory shock, no culprit medications.   - monitor while off CRRT     Sent [employer] message

## 2025-02-18 ENCOUNTER — LAB (OUTPATIENT)
Dept: LAB | Facility: HOSPITAL | Age: 50
End: 2025-02-18
Payer: MEDICARE

## 2025-02-18 LAB
ALBUMIN SERPL-MCNC: 4 G/DL (ref 3.5–5.2)
ALBUMIN/GLOB SERPL: 1 G/DL
ALP SERPL-CCNC: 87 U/L (ref 39–117)
ALT SERPL W P-5'-P-CCNC: 36 U/L (ref 1–33)
ANION GAP SERPL CALCULATED.3IONS-SCNC: 12.6 MMOL/L (ref 5–15)
AST SERPL-CCNC: 31 U/L (ref 1–32)
BILIRUB SERPL-MCNC: 0.5 MG/DL (ref 0–1.2)
BUN SERPL-MCNC: 15 MG/DL (ref 6–20)
BUN/CREAT SERPL: 14.2 (ref 7–25)
CALCIUM SPEC-SCNC: 10.2 MG/DL (ref 8.6–10.5)
CHLORIDE SERPL-SCNC: 98 MMOL/L (ref 98–107)
CO2 SERPL-SCNC: 26.4 MMOL/L (ref 22–29)
CREAT SERPL-MCNC: 1.06 MG/DL (ref 0.57–1)
EGFRCR SERPLBLD CKD-EPI 2021: 63.7 ML/MIN/1.73
GLOBULIN UR ELPH-MCNC: 4.1 GM/DL
GLUCOSE SERPL-MCNC: 134 MG/DL (ref 65–99)
POTASSIUM SERPL-SCNC: 3.3 MMOL/L (ref 3.5–5.2)
PROT SERPL-MCNC: 8.1 G/DL (ref 6–8.5)
SODIUM SERPL-SCNC: 137 MMOL/L (ref 136–145)

## 2025-02-18 PROCEDURE — 80053 COMPREHEN METABOLIC PANEL: CPT | Performed by: INTERNAL MEDICINE

## 2025-02-20 RX ORDER — METOPROLOL SUCCINATE 25 MG/1
25 TABLET, EXTENDED RELEASE ORAL DAILY
Qty: 30 TABLET | Refills: 11 | Status: SHIPPED | OUTPATIENT
Start: 2025-02-20

## 2025-02-21 RX ORDER — CETIRIZINE HYDROCHLORIDE 10 MG/1
10 TABLET ORAL DAILY
COMMUNITY

## 2025-02-21 RX ORDER — DICLOFENAC SODIUM 75 MG/1
75 TABLET, DELAYED RELEASE ORAL 2 TIMES DAILY
COMMUNITY
Start: 2025-01-15 | End: 2026-01-15

## 2025-02-22 DIAGNOSIS — K21.9 GASTROESOPHAGEAL REFLUX DISEASE WITHOUT ESOPHAGITIS: Primary | ICD-10-CM

## 2025-02-22 RX ORDER — PANTOPRAZOLE SODIUM 40 MG/1
40 TABLET, DELAYED RELEASE ORAL DAILY
Qty: 90 TABLET | Refills: 3 | OUTPATIENT
Start: 2025-02-22

## 2025-02-22 NOTE — TELEPHONE ENCOUNTER
Rx Refill Note  Requested Prescriptions     Pending Prescriptions Disp Refills    pantoprazole (PROTONIX) 40 MG EC tablet [Pharmacy Med Name: PANTOPRAZOLE SOD DR 40 MG TAB] 90 tablet      Sig: TAKE 1 TABLET BY MOUTH DAILY      Last office visit with prescribing clinician: 12/10/2024   Last telemedicine visit with prescribing clinician: Visit date not found   Next office visit with prescribing clinician: 12/17/2025                         Would you like a call back once the refill request has been completed: [] Yes [] No    If the office needs to give you a call back, can they leave a voicemail: [] Yes [] No    Sumaya Knight CMA/LMR  02/22/25, 14:35 EST

## 2025-02-23 DIAGNOSIS — R79.89 ELEVATED LFTS: Primary | ICD-10-CM

## 2025-02-24 ENCOUNTER — TELEPHONE (OUTPATIENT)
Dept: GASTROENTEROLOGY | Facility: CLINIC | Age: 50
End: 2025-02-24
Payer: MEDICARE

## 2025-02-24 NOTE — TELEPHONE ENCOUNTER
----- Message from Sea Oliver sent at 2/23/2025 10:23 AM EST -----  02/23/25       Tell her that her lab work showed that her kidney function (creatinine of 1.06) is minimally elevated.  Her potassium is minimally low at 3.3.  Her liver function tests are almost completely normal though her ALT is minimally elevated at 36.  On the day of her colonoscopy I will recheck her comprehensive metabolic profile.  Thx. kjh

## 2025-02-25 ENCOUNTER — ANESTHESIA EVENT (OUTPATIENT)
Dept: GASTROENTEROLOGY | Facility: HOSPITAL | Age: 50
End: 2025-02-25
Payer: MEDICARE

## 2025-02-25 ENCOUNTER — HOSPITAL ENCOUNTER (OUTPATIENT)
Facility: HOSPITAL | Age: 50
Setting detail: HOSPITAL OUTPATIENT SURGERY
Discharge: HOME | End: 2025-02-25
Attending: INTERNAL MEDICINE | Admitting: INTERNAL MEDICINE
Payer: MEDICARE

## 2025-02-25 ENCOUNTER — ANESTHESIA (OUTPATIENT)
Dept: GASTROENTEROLOGY | Facility: HOSPITAL | Age: 50
End: 2025-02-25
Payer: MEDICARE

## 2025-02-25 VITALS
WEIGHT: 149 LBS | HEIGHT: 63 IN | BODY MASS INDEX: 26.4 KG/M2 | SYSTOLIC BLOOD PRESSURE: 124 MMHG | RESPIRATION RATE: 16 BRPM | OXYGEN SATURATION: 98 % | TEMPERATURE: 98 F | HEART RATE: 76 BPM | DIASTOLIC BLOOD PRESSURE: 96 MMHG

## 2025-02-25 DIAGNOSIS — R79.89 ELEVATED LFTS: ICD-10-CM

## 2025-02-25 DIAGNOSIS — R10.84 GENERALIZED ABDOMINAL PAIN: ICD-10-CM

## 2025-02-25 DIAGNOSIS — K21.9 GASTROESOPHAGEAL REFLUX DISEASE, UNSPECIFIED WHETHER ESOPHAGITIS PRESENT: ICD-10-CM

## 2025-02-25 DIAGNOSIS — R79.89 ELEVATED LFTS: Primary | ICD-10-CM

## 2025-02-25 DIAGNOSIS — R19.7 DIARRHEA, UNSPECIFIED TYPE: ICD-10-CM

## 2025-02-25 DIAGNOSIS — K62.5 RECTAL BLEEDING: ICD-10-CM

## 2025-02-25 LAB
ALBUMIN SERPL-MCNC: 3.9 G/DL (ref 3.5–5.2)
ALBUMIN/GLOB SERPL: 1.5 G/DL
ALP SERPL-CCNC: 78 U/L (ref 39–117)
ALT SERPL W P-5'-P-CCNC: 34 U/L (ref 1–33)
ANION GAP SERPL CALCULATED.3IONS-SCNC: 10.1 MMOL/L (ref 5–15)
AST SERPL-CCNC: 35 U/L (ref 1–32)
BILIRUB SERPL-MCNC: 0.5 MG/DL (ref 0–1.2)
BUN SERPL-MCNC: 15 MG/DL (ref 6–20)
BUN/CREAT SERPL: 16.7 (ref 7–25)
CALCIUM SPEC-SCNC: 9.5 MG/DL (ref 8.6–10.5)
CHLORIDE SERPL-SCNC: 107 MMOL/L (ref 98–107)
CO2 SERPL-SCNC: 22.9 MMOL/L (ref 22–29)
CREAT SERPL-MCNC: 0.9 MG/DL (ref 0.57–1)
EGFRCR SERPLBLD CKD-EPI 2021: 77.6 ML/MIN/1.73
GLOBULIN UR ELPH-MCNC: 2.6 GM/DL
GLUCOSE SERPL-MCNC: 89 MG/DL (ref 65–99)
POTASSIUM SERPL-SCNC: 3.7 MMOL/L (ref 3.5–5.2)
PROT SERPL-MCNC: 6.5 G/DL (ref 6–8.5)
SODIUM SERPL-SCNC: 140 MMOL/L (ref 136–145)

## 2025-02-25 PROCEDURE — 25010000002 PHENYLEPHRINE 10 MG/ML SOLUTION 5 ML VIAL: Performed by: NURSE ANESTHETIST, CERTIFIED REGISTERED

## 2025-02-25 PROCEDURE — 80053 COMPREHEN METABOLIC PANEL: CPT | Performed by: INTERNAL MEDICINE

## 2025-02-25 PROCEDURE — 25810000003 LACTATED RINGERS PER 1000 ML: Performed by: INTERNAL MEDICINE

## 2025-02-25 PROCEDURE — 25010000002 LIDOCAINE 2% SOLUTION: Performed by: NURSE ANESTHETIST, CERTIFIED REGISTERED

## 2025-02-25 PROCEDURE — 25810000003 SODIUM CHLORIDE 0.9 % SOLUTION 250 ML FLEX CONT: Performed by: NURSE ANESTHETIST, CERTIFIED REGISTERED

## 2025-02-25 PROCEDURE — 25010000002 PROPOFOL 1000 MG/100ML EMULSION: Performed by: NURSE ANESTHETIST, CERTIFIED REGISTERED

## 2025-02-25 PROCEDURE — 88305 TISSUE EXAM BY PATHOLOGIST: CPT | Performed by: INTERNAL MEDICINE

## 2025-02-25 RX ORDER — LIDOCAINE HYDROCHLORIDE 20 MG/ML
INJECTION, SOLUTION INFILTRATION; PERINEURAL AS NEEDED
Status: DISCONTINUED | OUTPATIENT
Start: 2025-02-25 | End: 2025-02-25 | Stop reason: SURG

## 2025-02-25 RX ORDER — PROPOFOL 10 MG/ML
INJECTION, EMULSION INTRAVENOUS AS NEEDED
Status: DISCONTINUED | OUTPATIENT
Start: 2025-02-25 | End: 2025-02-25 | Stop reason: SURG

## 2025-02-25 RX ORDER — SODIUM CHLORIDE, SODIUM LACTATE, POTASSIUM CHLORIDE, CALCIUM CHLORIDE 600; 310; 30; 20 MG/100ML; MG/100ML; MG/100ML; MG/100ML
30 INJECTION, SOLUTION INTRAVENOUS CONTINUOUS PRN
Status: DISCONTINUED | OUTPATIENT
Start: 2025-02-25 | End: 2025-02-25 | Stop reason: HOSPADM

## 2025-02-25 RX ADMIN — PROPOFOL INJECTABLE EMULSION 70 MG: 10 INJECTION, EMULSION INTRAVENOUS at 09:44

## 2025-02-25 RX ADMIN — SODIUM CHLORIDE, POTASSIUM CHLORIDE, SODIUM LACTATE AND CALCIUM CHLORIDE 30 ML/HR: 600; 310; 30; 20 INJECTION, SOLUTION INTRAVENOUS at 09:38

## 2025-02-25 RX ADMIN — PROPOFOL INJECTABLE EMULSION 160 MCG/KG/MIN: 10 INJECTION, EMULSION INTRAVENOUS at 09:45

## 2025-02-25 RX ADMIN — PHENYLEPHRINE HYDROCHLORIDE 100 MCG: 10 INJECTION, SOLUTION INTRAVENOUS at 09:56

## 2025-02-25 RX ADMIN — PHENYLEPHRINE HYDROCHLORIDE 100 MCG: 10 INJECTION, SOLUTION INTRAVENOUS at 10:11

## 2025-02-25 RX ADMIN — PHENYLEPHRINE HYDROCHLORIDE 100 MCG: 10 INJECTION, SOLUTION INTRAVENOUS at 10:06

## 2025-02-25 RX ADMIN — LIDOCAINE HYDROCHLORIDE 60 MG: 20 INJECTION, SOLUTION INFILTRATION; PERINEURAL at 09:44

## 2025-02-25 NOTE — ANESTHESIA POSTPROCEDURE EVALUATION
Patient: Selma Holden    Procedure Summary       Date: 02/25/25 Room / Location: [facility] ENDOSCOPY 1 / [facility] ENDOSCOPY    Anesthesia Start: 0939 Anesthesia Stop: 1015    Procedures:       ESOPHAGOGASTRODUODENOSCOPY (EGD) with cold biopsies (Esophagus)      COLONOSCOPY to cecum with cold biopsies Diagnosis:       Gastroesophageal reflux disease, unspecified whether esophagitis present      Diarrhea, unspecified type      Rectal bleeding      Generalized abdominal pain      (Gastroesophageal reflux disease, unspecified whether esophagitis present [K21.9])      (Diarrhea, unspecified type [R19.7])      (Rectal bleeding [K62.5])      (Generalized abdominal pain [R10.84])    Surgeons: Sea Oliver MD Provider: Db Mclain MD    Anesthesia Type: MAC ASA Status: 2            Anesthesia Type: MAC    Vitals  Vitals Value Taken Time   /96 02/25/25 1039   Temp     Pulse 77 02/25/25 1040   Resp 16 02/25/25 1039   SpO2 97 % 02/25/25 1040   Vitals shown include unfiled device data.        Post Anesthesia Care and Evaluation    Patient location during evaluation: PACU  Patient participation: complete - patient participated  Level of consciousness: awake and alert  Pain management: adequate    Airway patency: patent  Anesthetic complications: No anesthetic complications    Cardiovascular status: acceptable  Respiratory status: acceptable  Hydration status: acceptable    Comments: --------------------            02/25/25               1039     --------------------   BP:       124/96     Pulse:      76       Resp:       16       Temp:                SpO2:      98%      --------------------

## 2025-02-25 NOTE — DISCHARGE INSTRUCTIONS
WHAT ARE DIVERTICULOSIS AND DIVERTICULITIS?  Many people have small pouches in their colons that bulge outward through weak spots, like an inner tube that pokes through weak places in a tire.  Each pouch is called a diverticulum.  The condition of having diverticula is DIVERTICULOSIS.  The condition becomes more common as people age.  About half of all people over the age of 60 have diverticulosis    When pouches become infected or inflamed, the condition is called DIVERTICULITIS.  This happens in 10% to 25% of people with diverticulosis.  Diverticulosis and diverticulitis are also called DIVERTICULAR DISEASE.     WHAT ARE THE SYMPTOMS?  Diverticulosis - Most people do not have any discomfort or symptoms.  However, symptoms may include mild cramps, bloating, and constipation.  Other diseases such as irritable bowel syndrome (IBS) and stomach ulcers cause similar problems, so these symptoms do not always mean a person has diverticulosis.  You should visit your doctor if you have these troubling symptoms.    Diverticulitis - The most common symptom is abdominal pain.  The most common sign is tenderness around the left side of the lower abdomen.  If infection is the cause, fever, nausea, vomiting, chills, cramping, and constipation may occur as well.  The severity depends on the extent of the infection and complications.    WHAT ARE THE COMPLICATIONS?  Diverticulitis can lead to bleeding, infections,perforations or tears, or blockages.  These complications always require treatment to prevent them from proggressing and causing serous illness.    Bleeding from a diverticula is a rare complication.  When this occurs, blood may appear in the toilet or in your stool.  Bleeding can be severe, but it may stop by itself and not require treatment.  Doctors believe bleeding diverticula are caused by a small blood vessel in a diverticulum that weakens and finally bursts.  If you have bleeding from the rectum, you should see your  doctor.  If the bleeding does not stop you may need surgery.    Abscess, Perforation, and Peritonitis - The infection causing diverticulitis often clears up after a few days of treatment with antibiotics.  If the condition gets worse, an abscess may form in the colon.  An abscess is an infected area with pus that may cause swelling and destroy tissue.  Sometimes the infected diverticula may develop small holes, called perforations.  These perforations allow pus to leak out of the colon into the abdominal area.  If the abscess is small and remains in the colon, it may clear up after treatment with antibiotics.  If not, the doctor may need to drain it.  A large abscess can become a serious problem if the infection leaks out and contaminates areas outside the colon.  Infection that spreads into the abdominal cavity is called peritonitis.  Peritonitis requires immediate surgery toclean the abdominal cavity and remove the damaged part of the colon.  Without surgery, peritonitis can be fatal.    FISTULA  A fistula is an abnormal connection of tissue between two organs or between an organ and the skin.  When damaged tissues come into contact with each other during infection, they sometimes stick together.  If they heal that way, a fistula forms.  When diverticulitis-related infection spreads through out the colon, the colon's tissue may stick to nearby tissues.  The organs usually involved are the bladder, small intestine, and skin.  The problem can be corrected with surgery to remove the fistula and affected part of the colon.    INTESTINAL OBSTRUCTION  The scarring caused by infection may cause partial or total blockage of the large intestine.  When this happens, the colon is unable to move bowel contents normally.  When the obstruction totally blocks the intestine, emergency surgery is necessary.  Partial blockage is not an emergency, so the surgery to correct it can be planned.    WHAT CAUSES DIVERTICULAR  DISEASE  Although not proven, the dominant theory is that a low-fiber diet is the main cause of diverticular disease.  The disease was first noticed in the [place] in the early 1900s.  At about the same time, processed foods were introduced into the American diet.  Many processed foods contain refined, low-fiber flour.  Unlike whole-wheat flour, refined flour has no wheat bran.    Diverticular disease is common in developed or industrialized countries-particularly the [place], [place], and [place]-where low-fiber diets are common.  The disease is rare in countries of [place] and [place], where people eat high-fiber vegetable diets.    Fiber is the part of fruits, vegetables, and whole grains that the body cannot digest.  Some fiber dissolves easily in water (soluble fiber).  It takes on a soft, jelly-like texture in the intestines.  Some fiber passes almost unchanged through the intestines (insoluble fiber).  Both kinds of fiber help make stools soft and easy to pass.  Fiber also prevents constipation.    Constipation makes the muscles strain to move stool that is too hard.  It is the main cause of increased pressure in the colon.  This excess pressure might cause the weak spots in the colon to bulge out and become diverticula.  Diverticulitis occurs when diverticula become infected or inflamed.  Doctors are not certain what causes the infection.  It may begin when stool or bacteria are caught in the diverticula.  An attack of diverticulitis can develop suddenly and without warning.    HOW DOES THE DOCTOR DIAGNOSE DIVERTICULAR DISEASE  The doctor asks about medical history, does a physical exam, and may perform one or more diagnostic tests.  Because most people do not have symptoms, diverticulosis is often found through tests ordered for another ailment.    When taking a medical history, the doctor may ask about bowel habits, symptoms, pain, diet, and medications.  The physical exam usually involves a  digital rectal exam.  To preform this test. The doctor inserts a gloved, lubricated finger into the rectum to detect tenderness, blockage, or blood.  The doctor may check stool for signs of bleeding and test blood for signs of infection.  The doctor may also order x-rays or other tests.    WHAT IS THE TREATMENT FOR DIVERTICULAR DISEASE  Increasing the amount of fiber in the diet may reduce symptoms of diverticulosis and prevent complications such as diverticulitis.  Fiber keeps stool soft and lowers pressure inside the colon so that bowel contents can move through easily.  The American Dietetic Association. Recommends 20 to 35 grams of fiber each day.  The doctor may also recommend taking a fiber product such as Citrucel or Metamucil once a dya.  These products are mixed water and provide about 2 to 3.5 grams of fiber per  Tablespoon, mixed with 8 ounces of water.    Avoidance of nuts, popcorn, and sunflower, pumpkin, kellen, and sesame seeds has been recommended by physicians out of fear that food particles could enter, block, or irritate the diverticula.  However, no scientific data support this treatment measure.  Eating a high-fiber diet is the only requirement highly emphasized across the medical literature.  Eliminating specific foods is not necessary.  The seeds in tomatoes, zucchini, cucumbers, strawberries, and raspberries, as well as poppy seeds, are generally considered harmless.  People differ in amounts and types of foods the can eat.  Decisions about diet should be made based on what works best for each person.  Keeping a food diary may help identify what foods may cause symptoms.    If cramps, bloating, and constipation are problems, the doctor may prescribe  Short course of pain medication.  However, many medications affect emptying of the colon, an undesirable side effect for people with diverticulosis.    DIVERTICULITIS  Treatment focuses on clearing up the infection and inflammation, resting the  colon, and preventing or minimizing complications.  An attack of diverticulitis without complications may respond to antibiotics within a few days if treated early.  To help the colon rest, the doctor may recommend bed rest and a liquid diet, along with a pain reliever.    An acute attack with severe pain or sever infection may require a hospital stay.  Most acute cases of diverticulitis are treated with antibiotics and a liquid diet.  The antibiotics are given by injection into a vein.  In some cases, however, surgery may be necessary.    WHEN IS SURGERY NECESSARY  If attacks are severe or frequent, the doctor may advise surgery.  The surgeon removes the affected part of the colon and joins the remaining sections.  This typed of surgery, called colon resection, aims to keep attacks from coming back and to prevent complications.  The doctor may also recommend surgery for complications of a fistula or intestinal obstruction.    If antibiotics do not correct an attack, emergency surgery may be required.  Other reasons for emergency surgery include a large abscess, perforation, peritonitis, or continued bleeding.    Emergency surgery usually involves 2 operations.  The first will clear the infected abdominal cavity and remove part of the colon.  Because infection and sometimes obstruction, it is not safe to rejoin the colon during the first operation.  Instead, the surgeon creates a temporary hole, or stoma, in the abdomen.  The end of the colon is connected to the hole, a procedure called a colostomy, to allow normal eating and bowel movements.  The stool goes into a bag attached to the opening in the abdomen.  In the second operation, the surgeon rejoins the ends of the colon.

## 2025-02-25 NOTE — H&P
Chief Complaint   Patient presents with    Diarrhea    Heartburn    Abdominal Pain         History of Present Illness:   49 y.o. female c/o chronic diarrhea x several yrs (since cholecystectomy 20 yrs ago). Upper and lower abd pain since 7/24. She has up to 5 BM/day. +dark stool. Some rectal bleeding. Abdominal bloating. H/o Digeorge Syndrome. She is gaining weight. No nausea or vomiting. NO fevers, chiills. Nonsmoker. Rare ETOH. Lives with significant other. 3 sons.        Recent EGD/CLS- 9/22/2020 (Dr. Sanchez):  EGD- erosive antral gastriltis, negative HP  CLS- 4 polyps- 2- 5 mm in size all hyperplastic and  sigmoid diverticlosis.      Medical History        Past Medical History:   Diagnosis Date    Anemia      Anxiety      Arthritis      CAD (coronary artery disease) 9/9/2024    Colon polyp      DiGeorge anomaly 2021    GERD (gastroesophageal reflux disease)      HL (hearing loss)      Hyperlipidemia      Hypertension      Hyperthyroidism      Hypothyroidism      Inflammatory bowel disease      Pain in left leg 12/04/2020    Pain in right leg 12/04/2020    Peptic ulceration      Radiculopathy, lumbar region 12/04/2020    Raynaud's syndrome without gangrene              Surgical History         Past Surgical History:   Procedure Laterality Date    COSMETIC SURGERY        ENDOMETRIAL ABLATION        FOOT SURGERY        HERNIA REPAIR        OTHER SURGICAL HISTORY         surgery    VEIN SURGERY        WISDOM TOOTH EXTRACTION   1988              Current Medications      Current Outpatient Medications:     albuterol sulfate  (90 Base) MCG/ACT inhaler, Inhale 1 puff Every 4 (Four) Hours As Needed., Disp: , Rfl:     aspirin (aspirin) 81 MG EC tablet, Take 1 tablet by mouth Daily. (Patient taking differently: Take 1 tablet by mouth As Needed.), Disp: 30 tablet, Rfl: 11    celecoxib (CeleBREX) 200 MG capsule, Take 1 capsule by mouth Daily., Disp: , Rfl:     [START ON 1/13/2025] Dupilumab (DUPIXENT) 300 MG/2ML  solution auto-injector injection, Inject 2 mL under the skin into the appropriate area as directed., Disp: , Rfl:     EPINEPHrine (EpiPen 2-Isiah) 0.3 MG/0.3ML solution auto-injector injection, 0.3 mL., Disp: , Rfl:     famotidine (PEPCID) 20 MG tablet, Take 1 tablet by mouth 2 (Two) Times a Day., Disp: , Rfl:     FeroSul 325 (65 Fe) MG tablet, Take 1 tablet by mouth Daily With Breakfast., Disp: 90 tablet, Rfl: 0    fexofenadine (Allegra Allergy) 180 MG tablet, Take 1 tablet by mouth Daily for 14 days., Disp: 14 tablet, Rfl: 0    hydroCHLOROthiazide 25 MG tablet, Take 1 tablet by mouth Daily., Disp: 90 tablet, Rfl: 3    Lancets (OneTouch Delica Plus Xcerku13W) misc, , Disp: , Rfl:     levothyroxine (SYNTHROID, LEVOTHROID) 75 MCG tablet, Take 1 tablet by mouth Daily. (Patient taking differently: Take 88 mcg by mouth Daily.), Disp: , Rfl:     Linzess 72 MCG capsule capsule, Take 1 capsule by mouth Daily., Disp: , Rfl:     losartan (COZAAR) 25 MG tablet, Take 0.5 tablets by mouth Daily., Disp: 90 tablet, Rfl: 0    multivitamin with minerals (HAIR SKIN & NAILS ADVANCED PO), Take 1 tablet by mouth Daily., Disp: , Rfl:     ondansetron ODT (ZOFRAN-ODT) 4 MG disintegrating tablet, , Disp: , Rfl:     OneTouch Ultra test strip, 1 each by Other route As Needed., Disp: , Rfl:     potassium chloride ER (K-TAB) 20 MEQ tablet controlled-release ER tablet, Take 1 tablet by mouth 2 (Two) Times a Day., Disp: , Rfl:     Probiotic Product (ALIGN PO), Take  by mouth., Disp: , Rfl:     rosuvastatin (CRESTOR) 10 MG tablet, Take 1 tablet by mouth Every Night., Disp: 90 tablet, Rfl: 3    topiramate (TOPAMAX) 25 MG tablet, Take 1 tablet by mouth 2 (Two) Times a Day., Disp: 180 tablet, Rfl: 3    Trelegy Ellipta 100-62.5-25 MCG/ACT inhaler, Inhale 1 puff Daily., Disp: , Rfl:     vitamin B-12 (CYANOCOBALAMIN) 1000 MCG tablet, Take 1 tablet by mouth Daily., Disp: , Rfl:     colestipol (COLESTID) 1 g tablet, Take 2 tablets by mouth 2 (Two) Times a  Day. (Patient not taking: Reported on 2025), Disp: 180 tablet, Rfl: 0    dicyclomine (BENTYL) 10 MG capsule, Take 1 capsule by mouth 4 (Four) Times a Day Before Meals & at Bedtime. (Patient not taking: Reported on 2025), Disp: , Rfl:     omeprazole (priLOSEC) 40 MG capsule, Take 1 capsule by mouth Daily., Disp: 30 capsule, Rfl: 11        Allergies         Allergies   Allergen Reactions    Gabapentin Rash    Hydrocodone Rash       Other reaction(s): Insomnia    Pineapple Hives and Diarrhea       Other reaction(s): Blisters     Other reaction(s): Blisters   Other reaction(s): Blisters    Pregabalin Hives and Rash    Amitriptyline Other (See Comments)    Codeine Itching    Metoprolol Hives       Heart racing    Amlodipine Rash    Povidone Iodine Rash    Prednisone Rash                  Family History   Problem Relation Age of Onset    Hypertension Mother      Diabetes Mother      Depression Mother      Lung cancer Mother      Hypertension Father      Sudden death Father 48         MI    Hyperlipidemia Father      Heart disease Father      Hypertension Maternal Grandmother      Cancer Maternal Grandfather      Hypertension Maternal Grandfather      Hypertension Paternal Grandmother      Hypertension Paternal Grandfather           Social History   Social History            Socioeconomic History    Marital status: Single   Tobacco Use    Smoking status: Former       Current packs/day: 0.00       Average packs/day: 0.5 packs/day for 15.0 years (7.5 ttl pk-yrs)       Types: Cigarettes       Start date:        Quit date: 2016       Years since quittin.0       Passive exposure: Past    Smokeless tobacco: Never   Vaping Use    Vaping status: Never Used   Substance and Sexual Activity    Alcohol use: No    Drug use: No    Sexual activity: Not Currently            Review of Systems   Gastrointestinal:  Positive for abdominal pain and diarrhea.   All other systems reviewed and are negative.     Pertinent positives  "and negatives documented in the HPI and all other systems reviewed and were found to be negative.  /92   Pulse (!) 45   Temp 97.5 °F (36.4 °C) (Temporal)   Ht 161.5 cm (63.6\")   Wt 67.4 kg (148 lb 9.6 oz)   BMI 25.83 kg/m²         Physical Exam  Vitals reviewed.   Constitutional:       General: She is not in acute distress.     Appearance: Normal appearance. She is well-developed. She is not diaphoretic.   HENT:      Head: Normocephalic and atraumatic. Hair is normal.      Right Ear: Hearing, tympanic membrane, ear canal and external ear normal. No decreased hearing noted. No drainage.      Left Ear: Hearing, tympanic membrane, ear canal and external ear normal. No decreased hearing noted.      Nose: Nose normal. No nasal deformity.      Mouth/Throat:      Mouth: Mucous membranes are moist.   Eyes:      General: Lids are normal.         Right eye: No discharge.         Left eye: No discharge.      Extraocular Movements: Extraocular movements intact.      Conjunctiva/sclera: Conjunctivae normal.      Pupils: Pupils are equal, round, and reactive to light.   Neck:      Thyroid: No thyromegaly.      Vascular: No JVD.      Trachea: No tracheal deviation.   Cardiovascular:      Rate and Rhythm: Normal rate and regular rhythm.      Pulses: Normal pulses.      Heart sounds: Normal heart sounds. No murmur heard.     No friction rub. No gallop.   Pulmonary:      Effort: Pulmonary effort is normal. No respiratory distress.      Breath sounds: Normal breath sounds. No wheezing or rales.   Chest:      Chest wall: No tenderness.   Abdominal:      General: Bowel sounds are normal. There is no distension.      Palpations: Abdomen is soft. There is no mass.      Tenderness: There is no abdominal tenderness. There is no guarding or rebound.      Hernia: No hernia is present.   Genitourinary:     Rectum: Normal. Guaiac result negative.   Musculoskeletal:         General: No tenderness or deformity. Normal range of motion. "      Cervical back: Normal range of motion and neck supple.   Lymphadenopathy:      Cervical: No cervical adenopathy.   Skin:     General: Skin is warm and dry.      Findings: No erythema or rash.   Neurological:      Mental Status: She is alert and oriented to person, place, and time.      Cranial Nerves: No cranial nerve deficit.      Motor: No abnormal muscle tone.      Coordination: Coordination normal.      Deep Tendon Reflexes: Reflexes are normal and symmetric. Reflexes normal.   Psychiatric:         Mood and Affect: Mood normal.         Behavior: Behavior normal.         Thought Content: Thought content normal.         Judgment: Judgment normal.            Diagnoses and all orders for this visit:     1. Gastroesophageal reflux disease, unspecified whether esophagitis present (Primary)  -     omeprazole (priLOSEC) 40 MG capsule; Take 1 capsule by mouth Daily.  Dispense: 30 capsule; Refill: 11  -     Amylase  -     Lipase  -     CBC & Differential  -     Comprehensive Metabolic Panel  -     Celiac Ab tTG DGP TIgA  -     TSH  -     Clostridioides difficile EIA - Stool, Per Rectum  -     Fecal Leukocytes - Stool, Per Rectum  -     Giardia Antigen - Stool, Per Rectum  -     Stool Culture (Reference Lab) - Stool, Per Rectum  -     Ova & Parasite Examination - Stool, Per Rectum  -     Case Request; Standing  -     Case Request  -     CT Abdomen Pelvis With Contrast; Future     2. Diarrhea, unspecified type  -     Amylase  -     Lipase  -     CBC & Differential  -     Comprehensive Metabolic Panel  -     Celiac Ab tTG DGP TIgA  -     TSH  -     Clostridioides difficile EIA - Stool, Per Rectum  -     Fecal Leukocytes - Stool, Per Rectum  -     Giardia Antigen - Stool, Per Rectum  -     Stool Culture (Reference Lab) - Stool, Per Rectum  -     Ova & Parasite Examination - Stool, Per Rectum  -     Case Request; Standing  -     Case Request  -     CT Abdomen Pelvis With Contrast; Future     3. Rectal bleeding  -      Amylase  -     Lipase  -     CBC & Differential  -     Comprehensive Metabolic Panel  -     Celiac Ab tTG DGP TIgA  -     TSH  -     Clostridioides difficile EIA - Stool, Per Rectum  -     Fecal Leukocytes - Stool, Per Rectum  -     Giardia Antigen - Stool, Per Rectum  -     Stool Culture (Reference Lab) - Stool, Per Rectum  -     Ova & Parasite Examination - Stool, Per Rectum  -     Case Request; Standing  -     Case Request  -     CT Abdomen Pelvis With Contrast; Future     4. Generalized abdominal pain  -     Amylase  -     Lipase  -     CBC & Differential  -     Comprehensive Metabolic Panel  -     Celiac Ab tTG DGP TIgA  -     TSH  -     Clostridioides difficile EIA - Stool, Per Rectum  -     Fecal Leukocytes - Stool, Per Rectum  -     Giardia Antigen - Stool, Per Rectum  -     Stool Culture (Reference Lab) - Stool, Per Rectum  -     Ova & Parasite Examination - Stool, Per Rectum  -     Case Request; Standing  -     Case Request  -     CT Abdomen Pelvis With Contrast; Future     Other orders  -     Implement Anesthesia orders day of procedure.; Standing  -     Follow Anesthesia Guidelines / Protocol; Future  -     Verify bowel prep was successful; Standing  -     Give tap water enema if bowel prep was insufficient; Standing        Assessment:  Chronic diarrhea, she is on Linzess  Melena  REctal bleeding  Heartburn  Abdominal pain - CT abd/pelvis in 8/24 was unremarkable.  Upper abd bloating        Recommendations:  Labs  Stool studies.  EGD and colonoscopy.  Change from Protonix to Omeprazole 40 mg/day  Stop Linzess  CT abd/pelvis     No follow-ups on file.     2/25/25 - No change from the above H and P.   Sea Oliver MD

## 2025-02-25 NOTE — ANESTHESIA PREPROCEDURE EVALUATION
Anesthesia Evaluation     Patient summary reviewed and Nursing notes reviewed                Airway   Mallampati: II  Dental      Pulmonary    (+) a smoker Former,  Cardiovascular     ECG reviewed  Patient on routine beta blocker  Rhythm: regular  Rate: normal    (+) hypertension, CAD, DVT, hyperlipidemia      Neuro/Psych  (+) numbness, psychiatric history Anxiety and Depression  GI/Hepatic/Renal/Endo    (+) GERD, PUD, GI bleeding , thyroid problem hypothyroidism    Musculoskeletal     Abdominal    Substance History - negative use     OB/GYN negative ob/gyn ROS         Other   arthritis,                   Anesthesia Plan    ASA 2     MAC     intravenous induction     Anesthetic plan, risks, benefits, and alternatives have been provided, discussed and informed consent has been obtained with: patient.    CODE STATUS:

## 2025-02-26 ENCOUNTER — OFFICE VISIT (OUTPATIENT)
Dept: CARDIOLOGY | Facility: CLINIC | Age: 50
End: 2025-02-26
Payer: MEDICARE

## 2025-02-26 VITALS
DIASTOLIC BLOOD PRESSURE: 84 MMHG | HEART RATE: 78 BPM | BODY MASS INDEX: 26.58 KG/M2 | HEIGHT: 63 IN | WEIGHT: 150 LBS | SYSTOLIC BLOOD PRESSURE: 128 MMHG

## 2025-02-26 DIAGNOSIS — I25.10 CORONARY ARTERY DISEASE INVOLVING NATIVE CORONARY ARTERY OF NATIVE HEART WITHOUT ANGINA PECTORIS: Primary | ICD-10-CM

## 2025-02-26 DIAGNOSIS — I10 ESSENTIAL HYPERTENSION: ICD-10-CM

## 2025-02-26 LAB
CYTO UR: NORMAL
LAB AP CASE REPORT: NORMAL
LAB AP INTRADEPARTMENTAL CONSULT: NORMAL
PATH REPORT.FINAL DX SPEC: NORMAL
PATH REPORT.GROSS SPEC: NORMAL

## 2025-02-26 PROCEDURE — 3074F SYST BP LT 130 MM HG: CPT | Performed by: INTERNAL MEDICINE

## 2025-02-26 PROCEDURE — 3079F DIAST BP 80-89 MM HG: CPT | Performed by: INTERNAL MEDICINE

## 2025-02-26 PROCEDURE — 99213 OFFICE O/P EST LOW 20 MIN: CPT | Performed by: INTERNAL MEDICINE

## 2025-02-26 PROCEDURE — 93000 ELECTROCARDIOGRAM COMPLETE: CPT | Performed by: INTERNAL MEDICINE

## 2025-02-26 RX ORDER — PANTOPRAZOLE SODIUM 40 MG/1
40 TABLET, DELAYED RELEASE ORAL DAILY
Qty: 90 TABLET | OUTPATIENT
Start: 2025-02-26

## 2025-02-26 NOTE — PROGRESS NOTES
"      CARDIOLOGY    Roger Mendez MD    ENCOUNTER DATE:  02/26/2025    Selma Holden / 49 y.o. / female        CHIEF COMPLAINT / REASON FOR OFFICE VISIT     Coronary Artery Disease (12/09/2024 Follow up)  Hypertension    HISTORY OF PRESENT ILLNESS       Coronary Artery Disease      Selma Holden is a 49 y.o. female who presents today for reevaluation.  She still having a little bit of bloating in her abdomen.  Occasionally tingling feeling she had some palpitations last night.  She has been on her beta-blocker now for 3 days.  Overall she is doing better blood pressure is better.      The following portions of the patient's history were reviewed and updated as appropriate: allergies, current medications, past family history, past medical history, past social history, past surgical history and problem list.      VITAL SIGNS     Visit Vitals  /84 (BP Location: Left arm)   Pulse 78   Ht 160 cm (63\")   Wt 68 kg (150 lb)   BMI 26.57 kg/m²         Wt Readings from Last 3 Encounters:   02/26/25 68 kg (150 lb)   02/25/25 67.6 kg (149 lb)   01/02/25 67.4 kg (148 lb 9.6 oz)     Body mass index is 26.57 kg/m².      REVIEW OF SYSTEMS   ROS        PHYSICAL EXAMINATION     Vitals reviewed.   Constitutional:       Appearance: Healthy appearance.   Pulmonary:      Effort: Pulmonary effort is normal.   Cardiovascular:      Normal rate. Regular rhythm. Normal S1. Normal S2.       Murmurs: There is no murmur.      No gallop.  No click. No rub.   Pulses:     Intact distal pulses.   Edema:     Peripheral edema absent.   Neurological:      Mental Status: Alert.           REVIEWED DATA       ECG 12 Lead    Date/Time: 2/26/2025 9:27 AM  Performed by: Roger Mendez MD    Authorized by: Roger Mendez MD  Comparison: compared with previous ECG from 7/7/2020  Similar to previous ECG  Rhythm: sinus rhythm    Clinical impression: normal ECG          Cardiac Procedures:      Lipid Panel          12/3/2024    08:24 "   Lipid Panel   Total Cholesterol 146    Triglycerides 131    HDL Cholesterol 37    VLDL Cholesterol 23    LDL Cholesterol  86    LDL/HDL Ratio 2.3          ASSESSMENT & PLAN      Diagnosis Plan   1. Coronary artery disease involving native coronary artery of native heart without angina pectoris        2. Essential hypertension              SUMMARY/DISCUSSION  Coronary disease stable  Hypertension her blood pressure is good.  She is little bit concerned because she still feels a little bit bloated.  She feels like her Lasix is not working as well but it drops her potassium.  I actually think we are making progress but I think she wants things to progress faster than they will.  We need her body to reach equilibrium.  In light of that I am not can to change a thing have her follow-up in 3 months and we will reassess.        MEDICATIONS         Discharge Medications            Accurate as of February 26, 2025  9:27 AM. If you have any questions, ask your nurse or doctor.                Changes to Medications        Instructions Start Date   aspirin 81 MG EC tablet  What changed:   when to take this  reasons to take this   81 mg, Oral, Daily      levothyroxine 75 MCG tablet  Commonly known as: SYNTHROID, LEVOTHROID  What changed: how much to take   75 mcg, Daily             Continue These Medications        Instructions Start Date   albuterol sulfate  (90 Base) MCG/ACT inhaler  Commonly known as: PROVENTIL HFA;VENTOLIN HFA;PROAIR HFA   1 puff, Every 4 Hours PRN      ALIGN PO   Take  by mouth.      cetirizine 10 MG tablet  Commonly known as: zyrTEC   10 mg, Daily      colestipol 1 g tablet  Commonly known as: COLESTID   2 g, Oral, 2 Times Daily      diclofenac 75 MG EC tablet  Commonly known as: VOLTAREN   75 mg, 2 Times Daily      dicyclomine 10 MG capsule  Commonly known as: BENTYL   10 mg, 4 Times Daily Before Meals & Nightly      Dupilumab 300 MG/2ML solution auto-injector injection  Commonly known as:  DUPIXENT   300 mg      EpiPen 2-Isiah 0.3 MG/0.3ML solution auto-injector injection  Generic drug: EPINEPHrine   0.3 mg      famotidine 20 MG tablet  Commonly known as: PEPCID   20 mg, 2 Times Daily      FeroSul 325 (65 Fe) MG tablet  Generic drug: ferrous sulfate   325 mg, Oral, Daily With Breakfast      fexofenadine 180 MG tablet  Commonly known as: Allegra Allergy   180 mg, Oral, Daily      hydroCHLOROthiazide 25 MG tablet   25 mg, Oral, Daily      Linzess 72 MCG capsule capsule  Generic drug: linaclotide   1 capsule, Daily      losartan 25 MG tablet  Commonly known as: COZAAR   12.5 mg, Oral, Daily      metoprolol succinate XL 25 MG 24 hr tablet  Commonly known as: TOPROL-XL   25 mg, Oral, Daily      multivitamin with minerals tablet tablet   1 tablet, Daily      omeprazole 40 MG capsule  Commonly known as: priLOSEC   40 mg, Oral, Daily      ondansetron ODT 4 MG disintegrating tablet  Commonly known as: ZOFRAN-ODT       OneTouch Delica Plus [id number] misc       OneTouch Ultra test strip  Generic drug: glucose blood   1 each, As Needed      potassium chloride ER 20 MEQ tablet controlled-release ER tablet  Commonly known as: K-TAB   20 mEq, 2 Times Daily      rosuvastatin 10 MG tablet  Commonly known as: CRESTOR   10 mg, Oral, Nightly      topiramate 25 MG tablet  Commonly known as: TOPAMAX   25 mg, Oral, 2 Times Daily      Trelegy Ellipta 100-62.5-25 MCG/ACT inhaler  Generic drug: Fluticasone-Umeclidin-Vilant   1 puff, Daily      vitamin B-12 1000 MCG tablet  Commonly known as: CYANOCOBALAMIN   1,000 mcg, Daily                   **Dragon Disclaimer:   Much of this encounter note is an electronic transcription/translation of spoken language to printed text. The electronic translation of spoken language may permit erroneous, or at times, nonsensical words or phrases to be inadvertently transcribed. Although I have reviewed the note for such errors, some may still exist.

## 2025-02-27 ENCOUNTER — TELEPHONE (OUTPATIENT)
Dept: GASTROENTEROLOGY | Facility: CLINIC | Age: 50
End: 2025-02-27
Payer: MEDICARE

## 2025-02-27 RX ORDER — PANTOPRAZOLE SODIUM 40 MG/1
40 TABLET, DELAYED RELEASE ORAL DAILY
Qty: 90 TABLET | Refills: 3 | Status: SHIPPED | OUTPATIENT
Start: 2025-02-27

## 2025-02-27 NOTE — TELEPHONE ENCOUNTER
Called pt and left  for pt to call back.     C/s placed in recall and  for 02/25/2028.    Results sent to pcp thru epic.      Also message sent to Dr Oliver regarding pt's message.

## 2025-02-27 NOTE — TELEPHONE ENCOUNTER
Caller: Selma Holden    Relationship to patient: Self    Best call back number: [phone]      Patient is needing: PT WOULD LIKE TO MAKE A FOLLOW UP APPT AND WANTS TO KNOW WHO DR. STEELE RECOMMENDS TO TAKE OVER FOR HIM. SHE ALSO WANTS TO KNOW IF HE CAN PRESCRIBE PROTONIX 40 MG INSTEAD OF TAKING OMEPRAZOLE.   PLEASE CALL PT TO DISCUSS.

## 2025-02-27 NOTE — TELEPHONE ENCOUNTER
Patient notified of results and recommendations and verbalized understanding    Advised pt we are awaiting instruction from Dr Oliver concerning her request to switch from omeprazole to pantoprazole.    I have also scheduled her a fu with Antonia so she can discuss which MD will follow her going forward.

## 2025-02-27 NOTE — TELEPHONE ENCOUNTER
Hub staff attempted to follow warm transfer process and was unsuccessful     Caller: Selma Holden    Relationship to patient: Self    Best call back number: [phone]     Patient is needing:   PATIENT IS RETURNING A CALL FROM LILIBETH HITCHCOCK.  PLEASE CALL BACK.

## 2025-02-27 NOTE — TELEPHONE ENCOUNTER
----- Message from Sea Oliver sent at 2/27/2025  7:42 AM EST -----  02/27/25       Tell her that pathology from her recent EGD showed normal duodenal biopsies.  The stomach biopsy showed mild to moderate inflammation of the stomach with no evidence of Helicobacter pylori.       There was a precancerous polyp in the cecum.  The other colon biopsies came back normal.  I recommend that she have a repeat colonoscopy in 3 to 5 years.       Please send a copy of this report to her PCP.  Niesha christie

## 2025-02-27 NOTE — TELEPHONE ENCOUNTER
Called pt and passed on Dr Oliver's recommendations and the pt verbalized understanding.  Pantoprazole sent to her [facility] pharmacy

## 2025-03-06 RX ORDER — LOSARTAN POTASSIUM 25 MG/1
25 TABLET ORAL DAILY
Qty: 30 TABLET | Refills: 11 | Status: SHIPPED | OUTPATIENT
Start: 2025-03-06

## 2025-03-17 ENCOUNTER — PATIENT MESSAGE (OUTPATIENT)
Dept: GASTROENTEROLOGY | Facility: CLINIC | Age: 50
End: 2025-03-17
Payer: MEDICARE

## 2025-03-17 RX ORDER — PLECANATIDE 3 MG/1
3 TABLET ORAL DAILY
Qty: 30 TABLET | Refills: 3 | Status: SHIPPED | OUTPATIENT
Start: 2025-03-17

## 2025-03-19 ENCOUNTER — TELEPHONE (OUTPATIENT)
Dept: GASTROENTEROLOGY | Facility: CLINIC | Age: 50
End: 2025-03-19
Payer: MEDICARE

## 2025-03-19 NOTE — TELEPHONE ENCOUNTER
PANDA SMART (Key: [id number])  PA Case ID #: [id number]  Rx #: [id number]  Need Help? Call us at [phone]  Outcome  Approved today by [employer] NCPDP 2017  Your request has been approved  Effective Date: 1/1/2025  Authorization Expiration Date: 12/31/2025  Drug  Trulance 3MG tablets

## 2025-04-02 ENCOUNTER — HOSPITAL ENCOUNTER (OUTPATIENT)
Dept: MRI IMAGING | Facility: HOSPITAL | Age: 50
Discharge: HOME | End: 2025-04-02
Admitting: INTERNAL MEDICINE
Payer: MEDICARE

## 2025-04-02 DIAGNOSIS — R10.84 GENERALIZED ABDOMINAL PAIN: ICD-10-CM

## 2025-04-02 DIAGNOSIS — R79.89 ELEVATED LFTS: ICD-10-CM

## 2025-04-02 PROCEDURE — A9577 INJ MULTIHANCE: HCPCS | Performed by: NURSE PRACTITIONER

## 2025-04-02 PROCEDURE — 25510000002 GADOBENATE DIMEGLUMINE 529 MG/ML SOLUTION: Performed by: NURSE PRACTITIONER

## 2025-04-02 PROCEDURE — 74183 MRI ABD W/O CNTR FLWD CNTR: CPT

## 2025-04-02 RX ADMIN — GADOBENATE DIMEGLUMINE 14 ML: 529 INJECTION, SOLUTION INTRAVENOUS at 11:15

## 2025-04-08 ENCOUNTER — TELEPHONE (OUTPATIENT)
Dept: GASTROENTEROLOGY | Facility: CLINIC | Age: 50
End: 2025-04-08

## 2025-04-08 NOTE — TELEPHONE ENCOUNTER
Caller: Selma Holden    Relationship: Self    Best call back number: [phone]    Caller requesting test results: YES    What test was performed: MRI OF ABDOMEN    When was the test performed: 4-2-25    Where was the test performed: [facility]    Additional notes: PATIENT CALLING TO GET HER RESULTS FROM THE SCAN

## 2025-04-08 NOTE — TELEPHONE ENCOUNTER
Called pt and on vm advised that her results of her mri are not back yet. Advised that they should be back this week and to check with us again in a day or two.

## 2025-04-09 ENCOUNTER — RESULTS FOLLOW-UP (OUTPATIENT)
Dept: GASTROENTEROLOGY | Facility: CLINIC | Age: 50
End: 2025-04-09
Payer: MEDICARE

## 2025-04-11 ENCOUNTER — TELEPHONE (OUTPATIENT)
Dept: GASTROENTEROLOGY | Facility: CLINIC | Age: 50
End: 2025-04-11
Payer: MEDICARE

## 2025-04-25 ENCOUNTER — TELEPHONE (OUTPATIENT)
Dept: GASTROENTEROLOGY | Facility: CLINIC | Age: 50
End: 2025-04-25

## 2025-04-25 NOTE — TELEPHONE ENCOUNTER
I agree, she should see her PCP for this.  Yellow spots are likely bruising but without seeing them, I cannot give her an answer on that.  With that said, yellow spots on the skin are UNlikely to be related to gastrointestinal issue.  I did review her history I did not see anything from a GI standpoint that would cause this.  Have her start with her PCP and then go from there.     Antonia Singh PA-C

## 2025-04-25 NOTE — TELEPHONE ENCOUNTER
Hub staff attempted to follow warm transfer process and was unsuccessful     Caller: Selma Holden    Relationship to patient: Self    Best call back number: [phone]    Patient is needing: PATIENT CALLED REPORTING YELLOW SPOTS ON HER STOMACH STATES THEY LOOK LIKE BRUISES, AND WOULD LIKE TO COME IN AND HAVE THESE CHECKED OUT. PLEASE CALL HER BACK   PATIENT REQUEST TO BE SEEN BEFORE 1 DUE TO HAVING TO  HER SON FROM SCHOOL TODAY

## 2025-04-25 NOTE — TELEPHONE ENCOUNTER
Called pt and pt reports that she has yellow spots on her abd.  There are 4-5 spots on her abd.  She reports that she is not sure if they are bruises.  She denies feeling bad.  Denies any new medications.  Denies any itching.   Antonia SANTOS covering in hospital. Spoke with Christelle SANTOS who advised pt should call her pcp or go to urgent care. Advised pt of this and advised would update.  Antonia SANTOS.  Verb understanding.

## 2025-04-25 NOTE — TELEPHONE ENCOUNTER
Called pt and on vm advised that we will send her a [employer] message regarding Antonia SANTOS's response. Advised her to let us know if she has questions.

## 2025-04-29 NOTE — TELEPHONE ENCOUNTER
Caller: Selma Holden    Relationship: Self    Best call back number: [phone]    Requested Prescriptions:   Requested Prescriptions     Pending Prescriptions Disp Refills    pantoprazole (PROTONIX) 40 MG EC tablet 90 tablet 3     Sig: Take 1 tablet by mouth Daily.        Pharmacy where request should be sent: [facility] [id number] [address] [place] [address] RD AT [facility]. - [phone]  - [phone] FX     Last office visit with prescribing clinician: Visit date not found   Last telemedicine visit with prescribing clinician: Visit date not found   Next office visit with prescribing clinician: 5/21/2025     Additional details provided by patient: PATIENT IS OUT OF MEDICATION    Does the patient have less than a 3 day supply:  [x] Yes  [] No    Would you like a call back once the refill request has been completed: [] Yes [x] No    If the office needs to give you a call back, can they leave a voicemail: [] Yes [x] No    Jyoti Dc Rep   04/29/25 08:57 EDT

## 2025-04-30 RX ORDER — PANTOPRAZOLE SODIUM 40 MG/1
40 TABLET, DELAYED RELEASE ORAL DAILY
Qty: 90 TABLET | Refills: 2 | Status: SHIPPED | OUTPATIENT
Start: 2025-04-30

## 2025-05-07 RX ORDER — FERROUS SULFATE 325(65) MG
1 TABLET ORAL
Qty: 90 TABLET | Refills: 0 | OUTPATIENT
Start: 2025-05-07

## 2025-05-12 ENCOUNTER — HOSPITAL ENCOUNTER (EMERGENCY)
Facility: HOSPITAL | Age: 50
Discharge: HOME | End: 2025-05-13
Attending: STUDENT IN AN ORGANIZED HEALTH CARE EDUCATION/TRAINING PROGRAM | Admitting: STUDENT IN AN ORGANIZED HEALTH CARE EDUCATION/TRAINING PROGRAM
Payer: MEDICARE

## 2025-05-12 ENCOUNTER — APPOINTMENT (OUTPATIENT)
Dept: GENERAL RADIOLOGY | Facility: HOSPITAL | Age: 50
End: 2025-05-12
Payer: MEDICARE

## 2025-05-12 VITALS
DIASTOLIC BLOOD PRESSURE: 85 MMHG | RESPIRATION RATE: 18 BRPM | SYSTOLIC BLOOD PRESSURE: 136 MMHG | OXYGEN SATURATION: 98 % | HEART RATE: 77 BPM | TEMPERATURE: 98.2 F

## 2025-05-12 DIAGNOSIS — R00.2 PALPITATIONS: ICD-10-CM

## 2025-05-12 DIAGNOSIS — R07.89 ATYPICAL CHEST PAIN: ICD-10-CM

## 2025-05-12 DIAGNOSIS — E87.6 HYPOKALEMIA: Primary | ICD-10-CM

## 2025-05-12 LAB
ALBUMIN SERPL-MCNC: 4.1 G/DL (ref 3.5–5.2)
ALBUMIN/GLOB SERPL: 1.7 G/DL
ALP SERPL-CCNC: 70 U/L (ref 39–117)
ALT SERPL W P-5'-P-CCNC: 12 U/L (ref 1–33)
ANION GAP SERPL CALCULATED.3IONS-SCNC: 11 MMOL/L (ref 5–15)
AST SERPL-CCNC: 18 U/L (ref 1–32)
BASOPHILS # BLD AUTO: 0.07 10*3/MM3 (ref 0–0.2)
BASOPHILS NFR BLD AUTO: 1 % (ref 0–1.5)
BILIRUB SERPL-MCNC: 0.3 MG/DL (ref 0–1.2)
BUN SERPL-MCNC: 12 MG/DL (ref 6–20)
BUN/CREAT SERPL: 13.6 (ref 7–25)
CALCIUM SPEC-SCNC: 9.5 MG/DL (ref 8.6–10.5)
CHLORIDE SERPL-SCNC: 105 MMOL/L (ref 98–107)
CO2 SERPL-SCNC: 24 MMOL/L (ref 22–29)
CREAT SERPL-MCNC: 0.88 MG/DL (ref 0.57–1)
D DIMER PPP FEU-MCNC: <0.27 MCGFEU/ML (ref 0–0.51)
DEPRECATED RDW RBC AUTO: 40.9 FL (ref 37–54)
EGFRCR SERPLBLD CKD-EPI 2021: 79.7 ML/MIN/1.73
EOSINOPHIL # BLD AUTO: 0.27 10*3/MM3 (ref 0–0.4)
EOSINOPHIL NFR BLD AUTO: 3.8 % (ref 0.3–6.2)
ERYTHROCYTE [DISTWIDTH] IN BLOOD BY AUTOMATED COUNT: 12.4 % (ref 12.3–15.4)
GEN 5 1HR TROPONIN T REFLEX: 10 NG/L
GLOBULIN UR ELPH-MCNC: 2.4 GM/DL
GLUCOSE SERPL-MCNC: 102 MG/DL (ref 65–99)
HCT VFR BLD AUTO: 35.7 % (ref 34–46.6)
HGB BLD-MCNC: 12.2 G/DL (ref 12–15.9)
HOLD SPECIMEN: NORMAL
HOLD SPECIMEN: NORMAL
IMM GRANULOCYTES # BLD AUTO: 0.03 10*3/MM3 (ref 0–0.05)
IMM GRANULOCYTES NFR BLD AUTO: 0.4 % (ref 0–0.5)
LYMPHOCYTES # BLD AUTO: 1.96 10*3/MM3 (ref 0.7–3.1)
LYMPHOCYTES NFR BLD AUTO: 27.7 % (ref 19.6–45.3)
MAGNESIUM SERPL-MCNC: 1.8 MG/DL (ref 1.6–2.6)
MCH RBC QN AUTO: 31.4 PG (ref 26.6–33)
MCHC RBC AUTO-ENTMCNC: 34.2 G/DL (ref 31.5–35.7)
MCV RBC AUTO: 91.8 FL (ref 79–97)
MONOCYTES # BLD AUTO: 0.59 10*3/MM3 (ref 0.1–0.9)
MONOCYTES NFR BLD AUTO: 8.3 % (ref 5–12)
NEUTROPHILS NFR BLD AUTO: 4.15 10*3/MM3 (ref 1.7–7)
NEUTROPHILS NFR BLD AUTO: 58.8 % (ref 42.7–76)
NRBC BLD AUTO-RTO: 0 /100 WBC (ref 0–0.2)
PLATELET # BLD AUTO: 256 10*3/MM3 (ref 140–450)
PMV BLD AUTO: 11.4 FL (ref 6–12)
POTASSIUM SERPL-SCNC: 2.8 MMOL/L (ref 3.5–5.2)
PROT SERPL-MCNC: 6.5 G/DL (ref 6–8.5)
QT INTERVAL: 394 MS
QTC INTERVAL: 448 MS
RBC # BLD AUTO: 3.89 10*6/MM3 (ref 3.77–5.28)
SODIUM SERPL-SCNC: 140 MMOL/L (ref 136–145)
T4 FREE SERPL-MCNC: 1.29 NG/DL (ref 0.92–1.68)
TROPONIN T NUMERIC DELTA: 1 NG/L
TROPONIN T SERPL HS-MCNC: 9 NG/L
TSH SERPL DL<=0.05 MIU/L-ACNC: 0.77 UIU/ML (ref 0.27–4.2)
WBC NRBC COR # BLD AUTO: 7.07 10*3/MM3 (ref 3.4–10.8)
WHOLE BLOOD HOLD COAG: NORMAL
WHOLE BLOOD HOLD SPECIMEN: NORMAL

## 2025-05-12 PROCEDURE — 85379 FIBRIN DEGRADATION QUANT: CPT | Performed by: PHYSICIAN ASSISTANT

## 2025-05-12 PROCEDURE — 25010000002 POTASSIUM CHLORIDE 10 MEQ/100ML SOLUTION: Performed by: PHYSICIAN ASSISTANT

## 2025-05-12 PROCEDURE — 84484 ASSAY OF TROPONIN QUANT: CPT | Performed by: STUDENT IN AN ORGANIZED HEALTH CARE EDUCATION/TRAINING PROGRAM

## 2025-05-12 PROCEDURE — 25810000003 SODIUM CHLORIDE 0.9 % SOLUTION: Performed by: PHYSICIAN ASSISTANT

## 2025-05-12 PROCEDURE — 71045 X-RAY EXAM CHEST 1 VIEW: CPT

## 2025-05-12 PROCEDURE — 80053 COMPREHEN METABOLIC PANEL: CPT

## 2025-05-12 PROCEDURE — 83735 ASSAY OF MAGNESIUM: CPT | Performed by: PHYSICIAN ASSISTANT

## 2025-05-12 PROCEDURE — 84439 ASSAY OF FREE THYROXINE: CPT | Performed by: PHYSICIAN ASSISTANT

## 2025-05-12 PROCEDURE — 93005 ELECTROCARDIOGRAM TRACING: CPT

## 2025-05-12 PROCEDURE — 93005 ELECTROCARDIOGRAM TRACING: CPT | Performed by: STUDENT IN AN ORGANIZED HEALTH CARE EDUCATION/TRAINING PROGRAM

## 2025-05-12 PROCEDURE — 96365 THER/PROPH/DIAG IV INF INIT: CPT

## 2025-05-12 PROCEDURE — 36415 COLL VENOUS BLD VENIPUNCTURE: CPT

## 2025-05-12 PROCEDURE — 84443 ASSAY THYROID STIM HORMONE: CPT | Performed by: PHYSICIAN ASSISTANT

## 2025-05-12 PROCEDURE — 93010 ELECTROCARDIOGRAM REPORT: CPT | Performed by: INTERNAL MEDICINE

## 2025-05-12 PROCEDURE — 84484 ASSAY OF TROPONIN QUANT: CPT

## 2025-05-12 PROCEDURE — 96366 THER/PROPH/DIAG IV INF ADDON: CPT

## 2025-05-12 PROCEDURE — 85025 COMPLETE CBC W/AUTO DIFF WBC: CPT

## 2025-05-12 PROCEDURE — 99284 EMERGENCY DEPT VISIT MOD MDM: CPT

## 2025-05-12 RX ORDER — POTASSIUM CHLORIDE 7.45 MG/ML
10 INJECTION INTRAVENOUS ONCE
Status: DISCONTINUED | OUTPATIENT
Start: 2025-05-12 | End: 2025-05-13 | Stop reason: HOSPADM

## 2025-05-12 RX ORDER — POTASSIUM CHLORIDE 7.45 MG/ML
10 INJECTION INTRAVENOUS ONCE
Status: COMPLETED | OUTPATIENT
Start: 2025-05-12 | End: 2025-05-12

## 2025-05-12 RX ORDER — SODIUM CHLORIDE 0.9 % (FLUSH) 0.9 %
10 SYRINGE (ML) INJECTION AS NEEDED
Status: DISCONTINUED | OUTPATIENT
Start: 2025-05-12 | End: 2025-05-13 | Stop reason: HOSPADM

## 2025-05-12 RX ORDER — ASPIRIN 325 MG
325 TABLET ORAL ONCE
Status: DISCONTINUED | OUTPATIENT
Start: 2025-05-12 | End: 2025-05-13 | Stop reason: HOSPADM

## 2025-05-12 RX ORDER — POTASSIUM CHLORIDE 1500 MG/1
40 TABLET, EXTENDED RELEASE ORAL ONCE
Status: COMPLETED | OUTPATIENT
Start: 2025-05-12 | End: 2025-05-12

## 2025-05-12 RX ADMIN — SODIUM CHLORIDE 1000 ML: 9 INJECTION, SOLUTION INTRAVENOUS at 22:00

## 2025-05-12 RX ADMIN — POTASSIUM CHLORIDE 40 MEQ: 1500 TABLET, EXTENDED RELEASE ORAL at 20:41

## 2025-05-12 RX ADMIN — POTASSIUM CHLORIDE 10 MEQ: 7.46 INJECTION, SOLUTION INTRAVENOUS at 21:49

## 2025-05-12 RX ADMIN — POTASSIUM CHLORIDE 10 MEQ: 7.46 INJECTION, SOLUTION INTRAVENOUS at 20:49

## 2025-05-12 RX ADMIN — POTASSIUM CHLORIDE 10 MEQ: 7.46 INJECTION, SOLUTION INTRAVENOUS at 22:57

## 2025-05-12 NOTE — ED TRIAGE NOTES
"Patient to ED via pv from home c/o bilateral lower extremity swelling, right leg pain and chest \"pinching\" in the right side of her chest that began today.   "

## 2025-05-13 RX ORDER — FERROUS SULFATE 325(65) MG
1 TABLET ORAL
Qty: 90 TABLET | Refills: 0 | OUTPATIENT
Start: 2025-05-13

## 2025-05-13 NOTE — ED PROVIDER NOTES
EMERGENCY DEPARTMENT ENCOUNTER  Room Number:  40/40  PCP: Patrick Hodges MD  Independent Historians: Patient      HPI:  Chief Complaint: had concerns including Chest Pain and Leg Pain.     A complete HPI/ROS/PMH/PSH/SH/FH are unobtainable due to: None    Chronic or social conditions impacting patient care (Social Determinants of Health): None      Context: Selma Holden is a 49 y.o. female with a medical history of hypothyroidism, GERD, DVT, hypertension,, CAD, generalized anxiety disorder presents emergency department today complaining of palpitations that is been ongoing for about a month.  Patient describes it as feeling of her heart is beating abnormally and then a pinching sensation in the right side of her chest.  She says its intermittent and denies palliative or provocative factors.  She says when the symptoms occur they only last for about 30 seconds before resolving.  She denies associated shortness of air or diaphoresis.  She denies that this is exertional in nature.  She has been seeing cardiology and they just recently changed her blood pressure medication from amlodipine to verapamil.  She reports she has had leg swelling since then.  She reports leg swelling is bilateral and denies any calf pain, discoloration, or recent travel or hospitalization.  She is scheduled to have a cardiac CT in about a month.  She recently had a stress test that was normal.  She is not currently anticoagulated.        Review of prior external notes (non-ED) -and- Review of prior external test results outside of this encounter:   Patient saw urogynecology in the office earlier this morning for sensation that she does not feel like she is emptying her bladder.  Postvoid residual was normal.  They discussed lifestyle modifications.  Referred to urology.    I reviewed labs from 4/25/2025, hemoglobin 13.4, Potassium 3.1    The cardiology office visit from 4/30/2025 where patient was being seen in follow-up for  palpitations.  She is on amlodipine, Crestor, and HCTZ with potassium.  Patient had a stress test that was unremarkable achieving 86% maximal heart rate.  Holter monitor showed frequent PVCs with several episodes of possible nonsustained V. tach.  She reports continued to have chest pain.  Most recent echo in 2024 was 58% EF with no valvular abnormality.  She has never had a cardiac cath.  They recommend beta-blocker but patient has a history of an allergy to a beta-blocker so her amlodipine was changed to verapamil for better heart rate control.  Plan was to proceed with CTA of the coronaries to further evaluate for coronary artery disease.  HCTZ was decreased to 12.5 mg.      PAST MEDICAL HISTORY  Active Ambulatory Problems     Diagnosis Date Noted    Acquired hypothyroidism 04/17/2017    Gastroesophageal reflux disease without esophagitis 04/17/2017    Encounter for long-term (current) use of NSAIDs 04/17/2017    Depression 04/17/2017    History of DVT (deep vein thrombosis) 04/17/2017    Arthritis 04/17/2017    ZAINA (generalized anxiety disorder) 04/17/2017    Essential hypertension 04/17/2017    Slow transit constipation 04/17/2017    Medicare annual wellness visit, subsequent 04/17/2017    Bilateral hearing loss 04/17/2017    Vitamin D deficiency 04/17/2017    Anemia 04/17/2017    Thyroid nodule 04/17/2017    Hypercoagulable state 04/17/2017    Seasonal allergies 04/01/2024    CAD (coronary artery disease) 09/09/2024    Diarrhea 10/17/2024    Gastroesophageal reflux disease 01/02/2025    Rectal bleeding 01/02/2025    Generalized abdominal pain 01/02/2025     Resolved Ambulatory Problems     Diagnosis Date Noted    Menstrual irregularity 04/17/2017    Dizziness 04/17/2017    Hashimoto's thyroiditis 04/17/2017    Pain and swelling of right lower leg 04/17/2017    Impacted cerumen of both ears 04/17/2017    Chest pain, atypical 08/12/2020     Past Medical History:   Diagnosis Date    Anxiety     Colon polyp      DiGeorge anomaly     GERD (gastroesophageal reflux disease)     HL (hearing loss)     Hyperlipidemia     Hypertension     Hyperthyroidism     Hypothyroidism     Inflammatory bowel disease     Pain in left leg 2020    Pain in right leg 2020    Peptic ulceration     Radiculopathy, lumbar region 2020    Raynaud's syndrome without gangrene          PAST SURGICAL HISTORY  Past Surgical History:   Procedure Laterality Date    COLONOSCOPY N/A 2025    Procedure: COLONOSCOPY to cecum with cold biopsies;  Surgeon: Sea Oliver MD;  Location: [facility] ENDOSCOPY;  Service: Gastroenterology;  Laterality: N/A;  rectal bleeding//diverticulosis, internal hemorrhoids    COSMETIC SURGERY      ENDOMETRIAL ABLATION      ENDOSCOPY N/A 2025    Procedure: ESOPHAGOGASTRODUODENOSCOPY (EGD) with cold biopsies;  Surgeon: Sea Oliver MD;  Location: [facility] ENDOSCOPY;  Service: Gastroenterology;  Laterality: N/A;  dyspepsia//gastritis    FOOT SURGERY      HERNIA REPAIR      OTHER SURGICAL HISTORY      surgery    VEIN SURGERY      WISDOM TOOTH EXTRACTION  1988         FAMILY HISTORY  Family History   Problem Relation Age of Onset    Hypertension Mother     Diabetes Mother     Depression Mother     Lung cancer Mother     Hypertension Father     Sudden death Father 48        MI    Hyperlipidemia Father     Heart disease Father     Hypertension Maternal Grandmother     Cancer Maternal Grandfather     Hypertension Maternal Grandfather     Hypertension Paternal Grandmother     Hypertension Paternal Grandfather          SOCIAL HISTORY  Social History     Socioeconomic History    Marital status: Single   Tobacco Use    Smoking status: Former     Current packs/day: 0.00     Average packs/day: 0.5 packs/day for 15.0 years (7.5 ttl pk-yrs)     Types: Cigarettes     Start date:      Quit date: 2016     Years since quittin.3     Passive exposure: Past    Smokeless tobacco: Never   Vaping Use    Vaping status: Never  Used   Substance and Sexual Activity    Alcohol use: No    Drug use: No    Sexual activity: Not Currently         ALLERGIES  Effexor [venlafaxine], Gabapentin, Hydrocodone, Pineapple, Pregabalin, Amitriptyline, Codeine, Metoprolol, Amlodipine, Povidone iodine, and Prednisone      REVIEW OF SYSTEMS  Included in HPI  All systems reviewed and negative except for those discussed in HPI.      PHYSICAL EXAM    I have reviewed the triage vital signs and nursing notes.    ED Triage Vitals   Temp Heart Rate Resp BP SpO2   05/12/25 1710 05/12/25 1710 05/12/25 1710 05/12/25 1719 05/12/25 1710   98.2 °F (36.8 °C) 83 18 (!) 149/102 97 %      Temp src Heart Rate Source Patient Position BP Location FiO2 (%)   -- 05/12/25 2006 05/12/25 2006 05/12/25 2006 --    Monitor Sitting Right arm        Physical Exam  Constitutional:       General: She is not in acute distress.     Appearance: Normal appearance.   HENT:      Head: Normocephalic and atraumatic.      Nose: Nose normal.      Mouth/Throat:      Mouth: Mucous membranes are moist.   Eyes:      Extraocular Movements: Extraocular movements intact.      Pupils: Pupils are equal, round, and reactive to light.   Cardiovascular:      Rate and Rhythm: Normal rate and regular rhythm.      Pulses: Normal pulses.      Heart sounds: Normal heart sounds.   Pulmonary:      Effort: Pulmonary effort is normal. No respiratory distress.      Breath sounds: Normal breath sounds. No wheezing, rhonchi or rales.   Abdominal:      General: Abdomen is flat. There is no distension.      Palpations: Abdomen is soft.      Tenderness: There is no abdominal tenderness.   Musculoskeletal:         General: Normal range of motion.      Cervical back: Normal range of motion and neck supple.      Right lower leg: No edema.      Left lower leg: No edema.   Skin:     General: Skin is warm and dry.      Capillary Refill: Capillary refill takes less than 2 seconds.   Neurological:      General: No focal deficit  present.      Mental Status: She is alert and oriented to person, place, and time.   Psychiatric:         Mood and Affect: Mood normal.         Behavior: Behavior normal.         LAB RESULTS  Recent Results (from the past 24 hours)   Comprehensive Metabolic Panel    Collection Time: 05/12/25  5:22 PM    Specimen: Arm, Left; Blood   Result Value Ref Range    Glucose 102 (H) 65 - 99 mg/dL    BUN 12 6 - 20 mg/dL    Creatinine 0.88 0.57 - 1.00 mg/dL    Sodium 140 136 - 145 mmol/L    Potassium 2.8 (L) 3.5 - 5.2 mmol/L    Chloride 105 98 - 107 mmol/L    CO2 24.0 22.0 - 29.0 mmol/L    Calcium 9.5 8.6 - 10.5 mg/dL    Total Protein 6.5 6.0 - 8.5 g/dL    Albumin 4.1 3.5 - 5.2 g/dL    ALT (SGPT) 12 1 - 33 U/L    AST (SGOT) 18 1 - 32 U/L    Alkaline Phosphatase 70 39 - 117 U/L    Total Bilirubin 0.3 0.0 - 1.2 mg/dL    Globulin 2.4 gm/dL    A/G Ratio 1.7 g/dL    BUN/Creatinine Ratio 13.6 7.0 - 25.0    Anion Gap 11.0 5.0 - 15.0 mmol/L    eGFR 79.7 >60.0 mL/min/1.73   High Sensitivity Troponin T    Collection Time: 05/12/25  5:22 PM    Specimen: Arm, Left; Blood   Result Value Ref Range    HS Troponin T 9 <14 ng/L   Green Top (Gel)    Collection Time: 05/12/25  5:22 PM   Result Value Ref Range    Extra Tube Hold for add-ons.    Lavender Top    Collection Time: 05/12/25  5:22 PM   Result Value Ref Range    Extra Tube hold for add-on    Gold Top - SST    Collection Time: 05/12/25  5:22 PM   Result Value Ref Range    Extra Tube Hold for add-ons.    Light Blue Top    Collection Time: 05/12/25  5:22 PM   Result Value Ref Range    Extra Tube Hold for add-ons.    CBC Auto Differential    Collection Time: 05/12/25  5:22 PM    Specimen: Arm, Left; Blood   Result Value Ref Range    WBC 7.07 3.40 - 10.80 10*3/mm3    RBC 3.89 3.77 - 5.28 10*6/mm3    Hemoglobin 12.2 12.0 - 15.9 g/dL    Hematocrit 35.7 34.0 - 46.6 %    MCV 91.8 79.0 - 97.0 fL    MCH 31.4 26.6 - 33.0 pg    MCHC 34.2 31.5 - 35.7 g/dL    RDW 12.4 12.3 - 15.4 %    RDW-SD 40.9 37.0  - 54.0 fl    MPV 11.4 6.0 - 12.0 fL    Platelets 256 140 - 450 10*3/mm3    Neutrophil % 58.8 42.7 - 76.0 %    Lymphocyte % 27.7 19.6 - 45.3 %    Monocyte % 8.3 5.0 - 12.0 %    Eosinophil % 3.8 0.3 - 6.2 %    Basophil % 1.0 0.0 - 1.5 %    Immature Grans % 0.4 0.0 - 0.5 %    Neutrophils, Absolute 4.15 1.70 - 7.00 10*3/mm3    Lymphocytes, Absolute 1.96 0.70 - 3.10 10*3/mm3    Monocytes, Absolute 0.59 0.10 - 0.90 10*3/mm3    Eosinophils, Absolute 0.27 0.00 - 0.40 10*3/mm3    Basophils, Absolute 0.07 0.00 - 0.20 10*3/mm3    Immature Grans, Absolute 0.03 0.00 - 0.05 10*3/mm3    nRBC 0.0 0.0 - 0.2 /100 WBC   ECG 12 Lead ED Triage Standing Order; Chest Pain    Collection Time: 05/12/25  5:22 PM   Result Value Ref Range    QT Interval 394 ms    QTC Interval 448 ms   High Sensitivity Troponin T 1Hr    Collection Time: 05/12/25  8:05 PM    Specimen: Arm, Left; Blood   Result Value Ref Range    HS Troponin T 10 <14 ng/L    Troponin T Numeric Delta 1 Abnormal if >/=3 ng/L   Magnesium    Collection Time: 05/12/25  8:05 PM    Specimen: Arm, Left; Blood   Result Value Ref Range    Magnesium 1.8 1.6 - 2.6 mg/dL   TSH    Collection Time: 05/12/25  8:05 PM    Specimen: Arm, Left; Blood   Result Value Ref Range    TSH 0.769 0.270 - 4.200 uIU/mL   T4, Free    Collection Time: 05/12/25  8:05 PM    Specimen: Arm, Left; Blood   Result Value Ref Range    Free T4 1.29 0.92 - 1.68 ng/dL   D-dimer, Quantitative    Collection Time: 05/12/25  8:51 PM    Specimen: Blood   Result Value Ref Range    D-Dimer, Quantitative <0.27 0.00 - 0.51 MCGFEU/mL         RADIOLOGY  XR Chest 1 View  Result Date: 5/12/2025  Portable chest radiograph  HISTORY: Chest pain  TECHNIQUE: Single AP portable radiograph of the chest  COMPARISON: None      FINDINGS AND IMPRESSION: Minimal left basilar pulmonary pacification is present suggestive atelectasis versus pneumonia in the appropriate context. Correlation with patient history is recommended with follow-up chest CT if  clinically indicated. At least continued tension follow-up chest radiographs in 6 to 8 weeks recommended to ensure resolution and exclude the possibility of neoplasm.  No pleural effusion or pneumothorax is faintly seen; however, please note evaluation is suboptimal due to collimation left lung apex field-of-view and therefore pathology, including a pneumothorax, would be missed. Cardiac silhouette within normal limits for size. There are right upper quadrant surgical clips.  This report was finalized on 5/12/2025 7:29 PM by Dr. Brent Kelley M.D on Workstation: [id number]          MEDICATIONS GIVEN IN ER  Medications   sodium chloride 0.9 % flush 10 mL (has no administration in time range)   aspirin tablet 325 mg (325 mg Oral Not Given 5/12/25 2133)   potassium chloride 10 mEq in 100 mL IVPB (0 mEq Intravenous Stopped 5/12/25 2149)     And   potassium chloride 10 mEq in 100 mL IVPB (0 mEq Intravenous Stopped 5/12/25 2257)     And   potassium chloride 10 mEq in 100 mL IVPB (10 mEq Intravenous New Bag 5/12/25 2257)     And   potassium chloride 10 mEq in 100 mL IVPB (10 mEq Intravenous Not Given 5/12/25 2320)   potassium chloride (KLOR-CON M20) CR tablet 40 mEq (40 mEq Oral Given 5/12/25 2041)   sodium chloride 0.9 % bolus 1,000 mL (1,000 mL Intravenous New Bag 5/12/25 2200)           OUTPATIENT MEDICATION MANAGEMENT:  Current Facility-Administered Medications Ordered in Epic   Medication Dose Route Frequency Provider Last Rate Last Admin    aspirin tablet 325 mg  325 mg Oral Once Iván Pate MD        potassium chloride 10 mEq in 100 mL IVPB  10 mEq Intravenous Once Diana Kuhn PA-C 100 mL/hr at 05/12/25 2257 10 mEq at 05/12/25 2257    And    potassium chloride 10 mEq in 100 mL IVPB  10 mEq Intravenous Once Diana Kuhn PA-C        sodium chloride 0.9 % flush 10 mL  10 mL Intravenous PRN Iván Pate MD         Current Outpatient Medications Ordered in Epic   Medication Sig Dispense  Refill    albuterol sulfate  (90 Base) MCG/ACT inhaler Inhale 1 puff Every 4 (Four) Hours As Needed.      aspirin (aspirin) 81 MG EC tablet Take 1 tablet by mouth Daily. (Patient taking differently: Take 1 tablet by mouth As Needed.) 30 tablet 11    cetirizine (zyrTEC) 10 MG tablet Take 1 tablet by mouth Daily.      colestipol (COLESTID) 1 g tablet TAKE 2 TABLETS BY MOUTH TWICE A  tablet 0    diclofenac (VOLTAREN) 75 MG EC tablet Take 1 tablet by mouth 2 (Two) Times a Day.      dicyclomine (BENTYL) 10 MG capsule Take 1 capsule by mouth 4 (Four) Times a Day Before Meals & at Bedtime.      Dupilumab (DUPIXENT) 300 MG/2ML solution auto-injector injection Inject 2 mL under the skin into the appropriate area as directed.      EPINEPHrine (EpiPen 2-Isiah) 0.3 MG/0.3ML solution auto-injector injection 0.3 mL.      famotidine (PEPCID) 20 MG tablet Take 1 tablet by mouth 2 (Two) Times a Day.      FeroSul 325 (65 Fe) MG tablet Take 1 tablet by mouth Daily With Breakfast. 90 tablet 0    fexofenadine (Allegra Allergy) 180 MG tablet Take 1 tablet by mouth Daily for 14 days. 14 tablet 0    hydroCHLOROthiazide 25 MG tablet Take 1 tablet by mouth Daily. 90 tablet 3    Lancets (OneTouch Delica Plus Oktgxs85H) misc       levothyroxine (SYNTHROID, LEVOTHROID) 75 MCG tablet Take 1 tablet by mouth Daily. (Patient taking differently: Take 88 mcg by mouth Daily.)      losartan (Cozaar) 25 MG tablet Take 1 tablet by mouth Daily. 30 tablet 11    metoprolol succinate XL (TOPROL-XL) 25 MG 24 hr tablet Take 1 tablet by mouth Daily. 30 tablet 11    multivitamin with minerals (HAIR SKIN & NAILS ADVANCED PO) Take 1 tablet by mouth Daily.      ondansetron ODT (ZOFRAN-ODT) 4 MG disintegrating tablet       OneTouch Ultra test strip 1 each by Other route As Needed.      pantoprazole (PROTONIX) 40 MG EC tablet Take 1 tablet by mouth Daily. 90 tablet 2    Plecanatide (Trulance) 3 MG tablet Take 1 tablet by mouth Daily. 30 tablet 3     potassium chloride ER (K-TAB) 20 MEQ tablet controlled-release ER tablet Take 1 tablet by mouth 2 (Two) Times a Day.      Probiotic Product (ALIGN PO) Take  by mouth.      rosuvastatin (CRESTOR) 10 MG tablet Take 1 tablet by mouth Every Night. 90 tablet 3    topiramate (TOPAMAX) 25 MG tablet Take 1 tablet by mouth 2 (Two) Times a Day. 180 tablet 3    Trelegy Ellipta 100-62.5-25 MCG/ACT inhaler Inhale 1 puff Daily.      vitamin B-12 (CYANOCOBALAMIN) 1000 MCG tablet Take 1 tablet by mouth Daily.           PROGRESS, DATA ANALYSIS, CONSULTS, AND MEDICAL DECISION MAKING  ORDERS PLACED DURING THIS VISIT:  Orders Placed This Encounter   Procedures    XR Chest 1 View    [place] Draw    Comprehensive Metabolic Panel    High Sensitivity Troponin T    CBC Auto Differential    High Sensitivity Troponin T 1Hr    D-dimer, Quantitative    Potassium    Magnesium    TSH    T4, Free    NPO Diet NPO Type: Strict NPO    Undress & Gown    Continuous Pulse Oximetry    Oxygen Therapy- Nasal Cannula; Titrate 1-6 LPM Per SpO2; 90 - 95%    ECG 12 Lead ED Triage Standing Order; Chest Pain    ECG 12 Lead ED Triage Standing Order; Chest Pain    Insert Peripheral IV    CBC & Differential    Green Top (Gel)    Lavender Top    Gold Top - SST    Light Blue Top       All labs have been independently interpreted by me.  All radiology studies have been reviewed by me. All EKG's have been independently viewed and interpreted by me.  Discussion below represents my analysis of pertinent findings related to patient's condition, differential diagnosis, treatment plan and final disposition.    Differential diagnosis includes but is not limited to:   My differential diagnosis for chest pain includes but is not limited to:  Muscle strain, costochondritis, myositis, pleurisy, rib fracture, intercostal neuritis, herpes zoster, tumor, myocardial infarction, coronary syndrome, unstable angina, angina, aortic dissection, mitral valve prolapse, pericarditis,  palpitations, pulmonary embolus, pneumonia, pneumothorax, lung cancer, GERD, esophagitis, esophageal spasm      ED Course:  ED Course as of 05/12/25 2340   Mon May 12, 2025   2016 WBC: 7.07 [CC]   2016 Hemoglobin: 12.2 [CC]   2016 HS Troponin T: 9 [CC]   2016 Potassium(!): 2.8  Will replace orally [CC]   2016 XR Chest 1 View  My independent interpretation of the chest x-ray is no acute infiltrate [CC]   2033 I discussed the case with Dr. Pate and he agrees to evaluate the patient at the bedside.    [CC]   2045 ECG 12 Lead ED Triage Standing Order; Chest Pain  Sinus rhythm, rate 78, normal axis, prolonged NH, otherwise normal intervals, no significant T or ST changes, no STEMI    I compared EKG to prior on 2/26/2025.  No significant change.  EKGs interpreted by self [DN]   2138 Magnesium: 1.8 [CC]   2139 Free T4: 1.29 [CC]   2139 TSH Baseline: 0.769 [CC]   2236 D-Dimer, Quant: <0.27 [CC]   2305 I rechecked the patient.  I discussed the patient's labs, radiology findings (including all incidental findings), diagnosis, and plan for discharge.  A repeat exam reveals no new worrisome changes from my initial exam findings.  The patient understands that the fact that they are being discharged does not denote that nothing is abnormal, it indicates that no clinical emergency is present and that they must follow-up as directed in order to properly maintain their health.  Follow-up instructions (specifically listed below) and return to ER precautions were given at this time.  I specifically instructed the patient to follow-up with their PCP.  The patient understands and agrees with the plan, and is ready for discharge.  All questions answered. [CC]      ED Course User Index  [CC] Diana Kuhn PA-C  [DN] Iván Pate MD           AS OF 23:40 EDT VITALS:    BP - 136/85  HR - 77  TEMP - 98.2 °F (36.8 °C)  O2 SATS - 98%        MDM:  Patient is a 49-year-old female presents emergency department today with  palpitations and atypical sounding right sided chest pain over the last month.  On arrival here in the emergency department vitals are reassuring, she is afebrile.  On my exam the patient is generally well-appearing and in no acute distress.  She also reports to me that she has bilateral lower extremity edema since starting verapamil she has no appreciable lower extremity edema on my exam.  Calves are supple and nontender.  Limbs are warm and well-perfused.  Patient was evaluated with EKG which is nonischemic in nature and shows no concerning arrhythmia.  Troponin is negative x 2.  Symptoms are not consistent with ACS.  She was evaluated with labs, which were generally reassuring with the exception of potassium being low at 2.8.  It does appear patient has chronic hypokalemia most likely secondary to her HCTZ use.  She is prescribed potassium replacement at home and does report that she has been taking it.  Her potassium was replaced here in the emergency department both orally and intravenously.  I suspect this may be contributing to her worsening palpitations.  Unfortunately patient has an allergy to beta-blockers and unable to use beta-blockers in treatment of the palpitations.  I did consider pulmonary embolism as a possible source of her symptoms given her history of DVT but her D-dimer is negative.  She is not hypoxic, is not tachycardic, and has no other signs or symptoms that would be concerning for DVT or PE.  Ultimately patient remained asymptomatic while here in the emergency department.  She will need to follow-up  for further medication adjustments with primary care and cardiology.  I encouraged her to discuss the HCTZ with her PCP given her chronic hypokalemia and ongoing palpitations.  Return precautions were given.  Patient will be discharged with outpatient follow-up.    HEART Score: 2        DIAGNOSIS  Final diagnoses:   Hypokalemia   Palpitations   Atypical chest pain         DISPOSITION  ED  Disposition       ED Disposition   Discharge    Condition   Stable    Comment   --                    Please note that portions of this document were completed with a voice recognition program.    Note Disclaimer: At [facility], we believe that sharing information builds trust and better relationships. You are receiving this note because you recently visited [facility]. It is possible you will see health information before a provider has talked with you about it. This kind of information can be easy to misunderstand. To help you fully understand what it means for your health, we urge you to discuss this note with your provider.     Diana Kuhn PATamaraC  05/12/25 [id number]

## 2025-05-13 NOTE — ED PROVIDER NOTES
MD ATTESTATION NOTE    The FRANCO and I have discussed this patient's history, physical exam, MDM, and treatment plan.  I have reviewed the documentation and personally had a face to face interaction with the patient. I affirm the documentation and agree with the treatment and plan.  The attached note describes my personal findings.      I provided a substantive portion of the medical decision making.        Brief HPI: 49-year-old female, history of hypothyroidism, DVT, CAD, hypertension, presents ED for evaluation of 1 month of palpitations.  No associated symptoms.  Nonexertional.  She notes new swelling in her legs after her blood pressure medication was changed.    PHYSICAL EXAM  ED Triage Vitals   Temp Heart Rate Resp BP SpO2   05/12/25 1710 05/12/25 1710 05/12/25 1710 05/12/25 1719 05/12/25 1710   98.2 °F (36.8 °C) 83 18 (!) 149/102 97 %      Temp src Heart Rate Source Patient Position BP Location FiO2 (%)   -- 05/12/25 2006 05/12/25 2006 05/12/25 2006 --    Monitor Sitting Right arm          GENERAL: no acute distress  HENT: nares patent  EYES: no scleral icterus  CV: regular rhythm, normal rate  RESPIRATORY: normal effort  ABDOMEN: soft  MUSCULOSKELETAL: no deformity  NEURO: alert, moves all extremities, follows commands  PSYCH:  calm, cooperative  SKIN: warm, dry    Vital signs and nursing notes reviewed.        Medical Decision Making:  ED Course as of 05/17/25 0759   Mon May 12, 2025   2016 WBC: 7.07 [CC]   2016 Hemoglobin: 12.2 [CC]   2016 HS Troponin T: 9 [CC]   2016 Potassium(!): 2.8  Will replace orally [CC]   2016 XR Chest 1 View  My independent interpretation of the chest x-ray is no acute infiltrate [CC]   2033 I discussed the case with Dr. Pate and he agrees to evaluate the patient at the bedside.    [CC]   2045 ECG 12 Lead ED Triage Standing Order; Chest Pain  Sinus rhythm, rate 78, normal axis, prolonged LA, otherwise normal intervals, no significant T or ST changes, no STEMI    I compared EKG  to prior on 2/26/2025.  No significant change.  EKGs interpreted by self [DN]   2138 Magnesium: 1.8 [CC]   2139 Free T4: 1.29 [CC]   2139 TSH Baseline: 0.769 [CC]   2236 D-Dimer, Quant: <0.27 [CC]   2305 I rechecked the patient.  I discussed the patient's labs, radiology findings (including all incidental findings), diagnosis, and plan for discharge.  A repeat exam reveals no new worrisome changes from my initial exam findings.  The patient understands that the fact that they are being discharged does not denote that nothing is abnormal, it indicates that no clinical emergency is present and that they must follow-up as directed in order to properly maintain their health.  Follow-up instructions (specifically listed below) and return to ER precautions were given at this time.  I specifically instructed the patient to follow-up with their PCP.  The patient understands and agrees with the plan, and is ready for discharge.  All questions answered. [CC]      ED Course User Index  [CC] Diana Kuhn PA-C  [DN] Iván Pate MD Nichols, Dylan J, MD  05/12/25 0563       Iván Pate MD  05/17/25 3870

## 2025-05-13 NOTE — DISCHARGE INSTRUCTIONS
Please follow-up with your PCP.    Rest.  Increase fluid intake.  Take potassium as prescribed    Although you are being discharged in the ED today, I encouraged her to return for worsening symptoms.  Things can, and do, change such a treatment at home with medication may not be adequate.  Specifically I recommend returning for chest pain or discomfort, difficulty breathing, persistent vomiting or difficulty holding down liquids or medications, fever > 102.0 F,  or any other worsening or alarming symptoms.     You have been evaluated in the emergency department for your presenting symptoms and deemed appropriate for discharge home.  Understand that your health care does not end here today.  It is important that your primary care physician be made aware of your visit.  I recommend calling your primary care provider in the next 48 hours to arrange follow-up care.  Your primary care provider needs to review your treatment and recovery to ensure that no further treatment is necessary.  Additional testing or procedures may be necessary as an outpatient at the discretion of your primary care provider.    I also recommend following up with your PCP for recheck of your blood pressure and treatment as needed.

## 2025-05-15 RX ORDER — FERROUS SULFATE 325(65) MG
1 TABLET ORAL
Qty: 90 TABLET | Refills: 0 | OUTPATIENT
Start: 2025-05-15

## 2025-05-21 ENCOUNTER — OFFICE VISIT (OUTPATIENT)
Dept: GASTROENTEROLOGY | Facility: CLINIC | Age: 50
End: 2025-05-21
Payer: MEDICARE

## 2025-05-21 VITALS
SYSTOLIC BLOOD PRESSURE: 126 MMHG | HEIGHT: 63 IN | DIASTOLIC BLOOD PRESSURE: 86 MMHG | TEMPERATURE: 97 F | HEART RATE: 72 BPM | BODY MASS INDEX: 27.43 KG/M2 | WEIGHT: 154.8 LBS

## 2025-05-21 DIAGNOSIS — R10.13 EPIGASTRIC PAIN: ICD-10-CM

## 2025-05-21 DIAGNOSIS — K58.2 IRRITABLE BOWEL SYNDROME WITH BOTH CONSTIPATION AND DIARRHEA: ICD-10-CM

## 2025-05-21 DIAGNOSIS — K59.04 CHRONIC IDIOPATHIC CONSTIPATION: Primary | ICD-10-CM

## 2025-05-21 DIAGNOSIS — R19.06 EPIGASTRIC MASS: ICD-10-CM

## 2025-05-21 DIAGNOSIS — K21.9 GASTROESOPHAGEAL REFLUX DISEASE, UNSPECIFIED WHETHER ESOPHAGITIS PRESENT: ICD-10-CM

## 2025-05-21 RX ORDER — POTASSIUM CHLORIDE 1500 MG/1
20 TABLET, EXTENDED RELEASE ORAL
COMMUNITY

## 2025-05-21 RX ORDER — VERAPAMIL HYDROCHLORIDE 240 MG/1
240 TABLET, FILM COATED, EXTENDED RELEASE ORAL
COMMUNITY
Start: 2025-04-30 | End: 2026-04-30

## 2025-05-21 RX ORDER — TIRZEPATIDE 2.5 MG/.5ML
2.5 INJECTION, SOLUTION SUBCUTANEOUS
COMMUNITY
Start: 2025-05-12

## 2025-05-21 RX ORDER — NYSTATIN 100000 [USP'U]/G
POWDER TOPICAL
COMMUNITY
Start: 2025-04-25 | End: 2026-04-25

## 2025-05-21 RX ORDER — LOTEPREDNOL ETABONATE 3.8 MG/G
GEL OPHTHALMIC
COMMUNITY
Start: 2025-04-16

## 2025-05-21 RX ORDER — LINACLOTIDE 72 UG/1
72 CAPSULE, GELATIN COATED ORAL DAILY
COMMUNITY
Start: 2025-05-14 | End: 2025-05-21

## 2025-05-21 RX ORDER — OFLOXACIN 3 MG/ML
SOLUTION/ DROPS OPHTHALMIC
COMMUNITY
Start: 2025-04-29

## 2025-05-21 NOTE — PROGRESS NOTES
"Chief Complaint  Diverticulosis and Irritable Bowel Syndrome    Subjective          History of Present Illness    Selma Holden is a  49 y.o. female presents for follow-up on chronic diarrhea.  She is a patient of Dr. Oliver last seen for EGD and colonoscopy.  She is new to me.    She has a history of chronic diarrhea since cholecystectomy 20 years ago.  But reports since Aug 2024 she has had constipation with BM every 5 days. 1/7/25 showed normal stool studies for infection, negative celiac panel. She is on Linzess 72mcg daily for years prescribed by Dr. Sanchez. Failed Trulance.     She reports \"knot\" in epigastrum, bloating. She reports nighttime reflux at night. Knot occurs with eating. She feels knot is worse with constipation and reflux is worse when knot is present. Some nausea, early satiety. Protonix changed to omeprazole 40 mg daily in January.  Omeprazole did not help so she went back to Protonix.     Denies vomiting. She is gaining weight without trying. She has been prescribed Zepbound for weight loss--has not started yet. Ozempic on 1.5 years ago but did not tolerate. She denies nausea, vomiting, fever, chills.    She showed mildly elevated transaminases in January.  These have normalized on 5/12/2025 labs.  May labs also showed normal CBC, TSH, free T4, and magnesium.    4/2/2025 MRCP showed cholecystectomy, no bile duct dilation, normal liver and pancreas.    1/16/2025 contrasted CT scan showed moderate stool burden, otherwise unremarkable.    2/25/2025 EGD showed pathology with normal duodenal biopsies, mild to moderate gastritis, negative H. pylori.    2/25/2025 colonoscopy showed adenomatous colon polyp, normal random colon biopsies.  Repeat 3-5 years.    H/o Digeorge Syndrome.     Objective   Vital Signs:   /86 (BP Location: Left arm, Patient Position: Sitting, Cuff Size: Adult)   Pulse 72   Temp 97 °F (36.1 °C) (Temporal)   Ht 160 cm (63\")   Wt 70.2 kg (154 lb 12.8 oz)   BMI 27.42 " kg/m²       Physical Exam  Vitals reviewed.   Constitutional:       General: She is awake. She is not in acute distress.     Appearance: Normal appearance. She is well-developed and well-groomed.   HENT:      Head: Normocephalic.   Pulmonary:      Effort: Pulmonary effort is normal. No respiratory distress.   Abdominal:      General: Bowel sounds are normal. There is no distension.      Palpations: Abdomen is soft. There is no hepatomegaly or mass.      Tenderness: There is abdominal tenderness in the epigastric area and periumbilical area. There is no guarding or rebound.      Comments: Firm area in the epigatrum with standing, not present with lying down. Unable to feel defect due to abdominal fat.    Skin:     Coloration: Skin is not pale.   Neurological:      Mental Status: She is alert and oriented to person, place, and time.      Gait: Gait is intact.   Psychiatric:         Mood and Affect: Mood and affect normal.         Speech: Speech normal.         Behavior: Behavior is cooperative.         Judgment: Judgment normal.          Result Review :             Assessment and Plan    Diagnoses and all orders for this visit:    1. Chronic idiopathic constipation (Primary)    2. Gastroesophageal reflux disease, unspecified whether esophagitis present    3. Epigastric pain    4. Irritable bowel syndrome with both constipation and diarrhea    5. Epigastric mass  -     Ambulatory Referral to General Surgery    Other orders  -     linaclotide (Linzess) 145 MCG capsule capsule; Take 1 capsule by mouth Every Morning Before Breakfast.  Dispense: 90 capsule; Refill: 1    Increase Linzess to 145 mcg daily for constipation since August 2024.  This may be causing much of her upper GI symptoms as well.  Will see how much resolves with effectively treating her constipation.    For now, continue pantoprazole. May consider change once constipation is better controlled.    Check GES given early satiety, nausea, and full  sensation.    Recommend not taking GLP-1's for now as this will worsen above symptoms.     She is very concerned about the epigastric knot which has been present for years. TTP at times, noticeable with standing but not with lying down. No mention of hernia on CT and MRI recently. She would like to see General Surgeon to get their opinion. Will refer.       Follow Up   Return in about 3 months (around 8/21/2025).    Dragon dictation used throughout this note.     Antonia Singh PA-C

## 2025-05-22 ENCOUNTER — TELEPHONE (OUTPATIENT)
Dept: GASTROENTEROLOGY | Facility: CLINIC | Age: 50
End: 2025-05-22
Payer: MEDICARE

## 2025-05-22 NOTE — TELEPHONE ENCOUNTER
Hub staff attempted to follow warm transfer process and was unsuccessful     Caller: Selma Holden    Relationship to patient: Self    Best call back number: [phone]    Patient is needing: TO BE SCHEDULED FOR A PROCEDURE TO EMPTY HER STOMACH. PLEASE CALL PT TO ADVISE.

## 2025-05-23 NOTE — TELEPHONE ENCOUNTER
Antonia Singh PA-C to [facility] Clinical 2 Pool (Selected Message)        5/22/25  4:11 PM  Yes, she is supposed to have a GES. The order is in now--I forgot.  She can call [phone], option 2 to schedule.

## 2025-05-23 NOTE — TELEPHONE ENCOUNTER
Called pt and left detailed msg on identified vm advising of DANIELLE Knight's  note.  Advised to call back if any questions.

## 2025-06-03 ENCOUNTER — HOSPITAL ENCOUNTER (OUTPATIENT)
Dept: NUCLEAR MEDICINE | Facility: HOSPITAL | Age: 50
Discharge: HOME | End: 2025-06-03
Admitting: PHYSICIAN ASSISTANT
Payer: MEDICARE

## 2025-06-03 PROCEDURE — 78264 GASTRIC EMPTYING IMG STUDY: CPT

## 2025-06-03 PROCEDURE — 34310000005 TECHNETIUM SULFUR COLLOID: Performed by: PHYSICIAN ASSISTANT

## 2025-06-03 PROCEDURE — A9541 TC99M SULFUR COLLOID: HCPCS | Performed by: PHYSICIAN ASSISTANT

## 2025-06-03 RX ADMIN — TECHNETIUM TC 99M SULFUR COLLOID 1 DOSE: KIT at 08:19

## 2025-06-11 ENCOUNTER — OFFICE VISIT (OUTPATIENT)
Dept: SURGERY | Facility: CLINIC | Age: 50
End: 2025-06-11
Payer: MEDICARE

## 2025-06-11 ENCOUNTER — PREP FOR SURGERY (OUTPATIENT)
Dept: OTHER | Facility: HOSPITAL | Age: 50
End: 2025-06-11
Payer: MEDICARE

## 2025-06-11 VITALS
DIASTOLIC BLOOD PRESSURE: 80 MMHG | BODY MASS INDEX: 27.29 KG/M2 | SYSTOLIC BLOOD PRESSURE: 110 MMHG | WEIGHT: 154 LBS | HEIGHT: 63 IN | HEART RATE: 77 BPM | OXYGEN SATURATION: 98 %

## 2025-06-11 DIAGNOSIS — K43.2 VENTRAL INCISIONAL HERNIA: Primary | ICD-10-CM

## 2025-06-11 DIAGNOSIS — K43.9 EPIGASTRIC HERNIA: ICD-10-CM

## 2025-06-11 DIAGNOSIS — R11.0 NAUSEA: ICD-10-CM

## 2025-06-11 PROCEDURE — 3079F DIAST BP 80-89 MM HG: CPT | Performed by: STUDENT IN AN ORGANIZED HEALTH CARE EDUCATION/TRAINING PROGRAM

## 2025-06-11 PROCEDURE — 99203 OFFICE O/P NEW LOW 30 MIN: CPT | Performed by: STUDENT IN AN ORGANIZED HEALTH CARE EDUCATION/TRAINING PROGRAM

## 2025-06-11 PROCEDURE — 1160F RVW MEDS BY RX/DR IN RCRD: CPT | Performed by: STUDENT IN AN ORGANIZED HEALTH CARE EDUCATION/TRAINING PROGRAM

## 2025-06-11 PROCEDURE — 3074F SYST BP LT 130 MM HG: CPT | Performed by: STUDENT IN AN ORGANIZED HEALTH CARE EDUCATION/TRAINING PROGRAM

## 2025-06-11 PROCEDURE — 1159F MED LIST DOCD IN RCRD: CPT | Performed by: STUDENT IN AN ORGANIZED HEALTH CARE EDUCATION/TRAINING PROGRAM

## 2025-06-11 RX ORDER — CEPHALEXIN 500 MG/1
500 CAPSULE ORAL 3 TIMES DAILY
COMMUNITY
Start: 2025-06-09 | End: 2025-06-16

## 2025-06-11 RX ORDER — FUROSEMIDE 20 MG/1
20 TABLET ORAL DAILY
COMMUNITY
Start: 2025-05-22 | End: 2026-05-22

## 2025-06-11 NOTE — PROGRESS NOTES
General Surgery History and Physical      Summary:    Selma Holden is a 49 y.o. lady w/ a history of laparoscopic cholecystectomy who presents today with a several year history of epigastric pain, bloating, nausea and constipation.  Upon review of her CT abdomen/pelvis from 1/16/2025, there is a small 1.5 cm ventral/incisional hernia without any evidence of bowel involvement.  We discussed that surgical intervention would entail an open ventral incisional hernia repair with possible mesh.  However, I could not guarantee that this would completely resolve her epigastric pain as she does have fairly moderate diastases recti as well.  Discussing her options, she decided that she would like to proceed with open ventral/incisional hernia repair with possible mesh placement.  Risks of the procedure were discussed, and she was willing to proceed.  She will need to stop Ozempic 1 week prior to the procedure.    In regard to the recent onset of nausea and bloating, I believe this is likely due to the Ozempic that she started approximately 3 weeks ago.  I recommended speaking with her primary care provider about discontinuing this medication.  In regard to the constipation, I recommend that she continue follow-up with Dr. Oliver's office for further management.    Referring Provider: Antonia Singh PA-C    Chief Complaint:    Epigastric pain, bloating     History of Present Illness:    Selma Holden is a 49 y.o. lady who presents today with epigastric pain, bloating, nausea, and constipation.  She states that she feels a knot that bulges out in her epigastric region which is associated with a burning pain.  In addition, she recently started Ozempic and has since been experiencing worsening reflux and nausea.  She is currently following with Dr. Oliver for her GI symptoms.    Past Medical History:   Hyperthyroidism  Anxiety  Arthritis  CAD  DiGeorge anomaly  GERD  HLD  HTN  IBD    Past Surgical History:    Umbilical  hernia repair  Lap ramy  EGD and colonoscopy  Endometrial ablation  Foot surgery  Sieper tooth extraction    Family History:    + history of colon cancer: maternal grandfather    Social History:    Former tobacco use  Occasional alcohol use    Allergies:   Allergies   Allergen Reactions    Effexor [Venlafaxine] Shortness Of Breath    Gabapentin Rash    Hydrocodone Rash     Other reaction(s): Insomnia    Pineapple Hives and Diarrhea     Other reaction(s): Blisters    Other reaction(s): Blisters   Other reaction(s): Blisters    Pregabalin Hives and Rash    Amitriptyline Other (See Comments)    Codeine Itching    Metoprolol Hives     Heart racing    Amlodipine Rash    Povidone Iodine Rash    Prednisone Rash       Medications:     Current Outpatient Medications:     albuterol sulfate  (90 Base) MCG/ACT inhaler, Inhale 1 puff Every 4 (Four) Hours As Needed., Disp: , Rfl:     aspirin (aspirin) 81 MG EC tablet, Take 1 tablet by mouth Daily. (Patient taking differently: Take 1 tablet by mouth As Needed.), Disp: 30 tablet, Rfl: 11    cetirizine (zyrTEC) 10 MG tablet, Take 1 tablet by mouth Daily., Disp: , Rfl:     Cholecalciferol 50 MCG (2000 UT) capsule, Take 1 capsule by mouth Daily., Disp: , Rfl:     colestipol (COLESTID) 1 g tablet, TAKE 2 TABLETS BY MOUTH TWICE A DAY, Disp: 180 tablet, Rfl: 0    Dupilumab (DUPIXENT) 300 MG/2ML solution auto-injector injection, Inject 2 mL under the skin into the appropriate area as directed., Disp: , Rfl:     EPINEPHrine (EpiPen 2-Isiah) 0.3 MG/0.3ML solution auto-injector injection, 0.3 mL., Disp: , Rfl:     famotidine (PEPCID) 20 MG tablet, Take 1 tablet by mouth 2 (Two) Times a Day., Disp: , Rfl:     FeroSul 325 (65 Fe) MG tablet, Take 1 tablet by mouth Daily With Breakfast., Disp: 90 tablet, Rfl: 0    fexofenadine (Allegra Allergy) 180 MG tablet, Take 1 tablet by mouth Daily for 14 days., Disp: 14 tablet, Rfl: 0    hydroCHLOROthiazide 25 MG tablet, Take 1 tablet by mouth  Daily., Disp: 90 tablet, Rfl: 3    Lancets (OneTouch Delica Plus [id number]) misc, , Disp: , Rfl:     levothyroxine (SYNTHROID, LEVOTHROID) 75 MCG tablet, Take 1 tablet by mouth Daily. (Patient taking differently: Take 88 mcg by mouth Daily.), Disp: , Rfl:     linaclotide (Linzess) 145 MCG capsule capsule, Take 1 capsule by mouth Every Morning Before Breakfast., Disp: 90 capsule, Rfl: 1    Lotemax SM 0.38 % gel, , Disp: , Rfl:     multivitamin with minerals (HAIR SKIN & NAILS ADVANCED PO), Take 1 tablet by mouth Daily., Disp: , Rfl:     nystatin (MYCOSTATIN) 787557 UNIT/GM powder, Apply  topically to the appropriate area as directed., Disp: , Rfl:     ofloxacin (OCUFLOX) 0.3 % ophthalmic solution, , Disp: , Rfl:     OneTouch Ultra test strip, 1 each by Other route As Needed., Disp: , Rfl:     pantoprazole (PROTONIX) 40 MG EC tablet, Take 1 tablet by mouth Daily., Disp: 90 tablet, Rfl: 2    potassium chloride (KLOR-CON M20) 20 MEQ CR tablet, Take 1 tablet by mouth., Disp: , Rfl:     Probiotic Product (ALIGN PO), Take  by mouth., Disp: , Rfl:     rosuvastatin (CRESTOR) 10 MG tablet, Take 1 tablet by mouth Every Night., Disp: 90 tablet, Rfl: 3    Tirzepatide-Weight Management (Zepbound) 2.5 MG/0.5ML solution auto-injector, Inject 0.5 mL under the skin into the appropriate area as directed., Disp: , Rfl:     topiramate (TOPAMAX) 25 MG tablet, Take 1 tablet by mouth 2 (Two) Times a Day., Disp: 180 tablet, Rfl: 3    Trelegy Ellipta 100-62.5-25 MCG/ACT inhaler, Inhale 1 puff Daily., Disp: , Rfl:     verapamil SR (CALAN-SR) 240 MG CR tablet, Take 1 tablet by mouth., Disp: , Rfl:     vitamin B-12 (CYANOCOBALAMIN) 1000 MCG tablet, Take 1 tablet by mouth Daily., Disp: , Rfl:     Radiology/Endoscopy:    The abdomen pelvis from 1/16/2025 demonstrates an approximately 1.5 cm ventral/incisional hernia without any bowel involvement.  6/3/2025 gastric emptying scan: Normal scintigraphic gastric emptying.  EGD colonoscopy performed  2/25/2025 by Dr. Oliver.  EGD showed mildly erythematous mucosa without bleeding.  Stomach biopsy showed mild to moderate reactive/chemical gastropathy.  Colonoscopy showed sigmoid diverticulosis and internal hemorrhoids.  Tissue path revealed a tubular adenoma, and a normal rectal biopsy.    Labs:    Labs from 5/19/2025 reviewed by me. Lipase 62 K 3.3 RBC 3.93 HCT 35    Review of Systems:   Constitutional: Negative for fevers or chills  HENT: Negative for hearing loss or runny nose  Eyes: Negative for vision changes or scleral icterus  Respiratory: Negative for cough or shortness of breath  Cardiovascular: Negative for chest pain or heart palpitations  Gastrointestinal: Positive for epigastric abdominal pain, nausea, bloating and constipation.    Genitourinary: Negative for hematuria or dysuria  Musculoskeletal: Negative for joint swelling or gait instability  Neurologic: Negative for tremors or seizures  Psychiatric: Negative for suicidal ideations or depression  All other systems reviewed and negative    Physical Exam:   Constitutional: Well-developed well-nourished, no acute distress  Eyes: Conjunctiva normal, sclera nonicteric  ENMT: Hearing grossly normal, oral mucosa moist  Neck: Supple, trachea midline  Respiratory: Breathing comfortably, normal inspiratory effort  Cardiovascular: Regular rate, no peripheral edema, no jugular venous distention  Gastrointestinal: Diastases recti.  Mild epigastric tenderness to palpation.  No discrete hernia palpated on exam.  Skin:  Warm, dry, no rash on visualized skin surfaces  Musculoskeletal: Symmetric strength, normal gait  Psychiatric: Alert and oriented ×3, normal affect         Moni Lundy M.D.  General and Endoscopic Surgery  [facility]    [address] [place] [address], [address]  [place], [place], [facility]  P: [phone]  F: [phone]

## 2025-06-20 ENCOUNTER — TRANSCRIBE ORDERS (OUTPATIENT)
Dept: ADMINISTRATIVE | Facility: HOSPITAL | Age: 50
End: 2025-06-20
Payer: MEDICARE

## 2025-06-20 DIAGNOSIS — R20.2 PARESTHESIA OF SKIN: Primary | ICD-10-CM

## 2025-07-03 ENCOUNTER — TELEPHONE (OUTPATIENT)
Dept: SURGERY | Facility: CLINIC | Age: 50
End: 2025-07-03
Payer: MEDICARE

## 2025-07-03 NOTE — TELEPHONE ENCOUNTER
I called patient to reschedule her surgery. She had sent a message to Dr. Lundy stating she was having a couple procedures prior to her surgery on 8/21/25. Patient has been rescheduled to 9/18/25, and new instructions sent to her MyChart.

## 2025-07-07 RX ORDER — COLESTIPOL HYDROCHLORIDE 1 G/1
2 TABLET ORAL 2 TIMES DAILY
Qty: 180 TABLET | Refills: 0 | Status: SHIPPED | OUTPATIENT
Start: 2025-07-07

## 2025-07-07 NOTE — TELEPHONE ENCOUNTER
Rx Refill Note  Requested Prescriptions     Pending Prescriptions Disp Refills    colestipol (COLESTID) 1 g tablet [Pharmacy Med Name: COLESTIPOL HCL 1 GM TABLET] 180 tablet 0     Sig: TAKE 2 TABLETS BY MOUTH TWICE A DAY      Last office visit with prescribing clinician: 12/10/2024   Last telemedicine visit with prescribing clinician: Visit date not found   Next office visit with prescribing clinician: 12/17/2025                         Would you like a call back once the refill request has been completed: [] Yes [] No    If the office needs to give you a call back, can they leave a voicemail: [] Yes [] No    Anel Estrada MA  07/07/25, 11:01 EDT

## 2025-07-14 ENCOUNTER — TELEPHONE (OUTPATIENT)
Dept: SURGERY | Facility: CLINIC | Age: 50
End: 2025-07-14
Payer: MEDICARE

## 2025-07-14 NOTE — TELEPHONE ENCOUNTER
HUB OK TO READ/UDLCE    Attempted to reach patient to schedule to inform her that we need to move her appointment from 10/3 to 10/6 due to Dr. Lundy being out. No answer, left VM.

## 2025-07-22 RX ORDER — FERROUS SULFATE 325(65) MG
1 TABLET ORAL
Qty: 90 TABLET | Refills: 0 | OUTPATIENT
Start: 2025-07-22

## 2025-07-22 NOTE — TELEPHONE ENCOUNTER
Rx Refill Note  Requested Prescriptions     Pending Prescriptions Disp Refills    FeroSul 325 (65 Fe) MG tablet [Pharmacy Med Name: FEROSUL 325 MG TABLET] 90 tablet 0     Sig: TAKE 1 TABLET BY MOUTH DAILY WITH BREAKFAST      Last office visit with prescribing clinician: 12/10/2024   Last telemedicine visit with prescribing clinician: Visit date not found   Next office visit with prescribing clinician: 12/17/2025                         Would you like a call back once the refill request has been completed: [] Yes [] No    If the office needs to give you a call back, can they leave a voicemail: [] Yes [] No    Lavon Busby CMA/LMR  07/22/25, 15:35 EDT

## 2025-08-20 ENCOUNTER — TELEPHONE (OUTPATIENT)
Dept: GASTROENTEROLOGY | Facility: CLINIC | Age: 50
End: 2025-08-20
Payer: MEDICARE

## 2025-08-26 ENCOUNTER — TELEPHONE (OUTPATIENT)
Dept: GASTROENTEROLOGY | Facility: CLINIC | Age: 50
End: 2025-08-26
Payer: MEDICARE

## (undated) DEVICE — TUBING, SUCTION, 1/4" X 10', STRAIGHT: Brand: MEDLINE

## (undated) DEVICE — ADAPT CLN BIOGUARD AIR/H2O DISP

## (undated) DEVICE — KT ORCA ORCAPOD DISP STRL

## (undated) DEVICE — SENSR O2 OXIMAX FNGR A/ 18IN NONSTR

## (undated) DEVICE — CANN O2 ETCO2 FITS ALL CONN CO2 SMPL A/ 7IN DISP LF

## (undated) DEVICE — SINGLE-USE BIOPSY FORCEPS: Brand: RADIAL JAW 4

## (undated) DEVICE — BLCK/BITE BLOX W/DENTL/RIM W/STRAP 54F

## (undated) DEVICE — LN SMPL CO2 SHTRM SD STREAM W/M LUER